# Patient Record
Sex: FEMALE | Race: WHITE | NOT HISPANIC OR LATINO
[De-identification: names, ages, dates, MRNs, and addresses within clinical notes are randomized per-mention and may not be internally consistent; named-entity substitution may affect disease eponyms.]

---

## 2017-11-17 ENCOUNTER — LAB SERVICES (OUTPATIENT)
Dept: OTHER | Age: 32
End: 2017-11-17

## 2017-11-17 ENCOUNTER — CHARTING TRANS (OUTPATIENT)
Dept: OTHER | Age: 32
End: 2017-11-17

## 2017-11-18 LAB
ALBUMIN SERPL-MCNC: 4.3 G/DL (ref 3.6–5.1)
ALBUMIN/GLOB SERPL: 1.3 (ref 1–2.4)
ALP SERPL-CCNC: 88 UNITS/L (ref 45–117)
ALT SERPL-CCNC: 24 UNITS/L
ANION GAP SERPL CALC-SCNC: 12 MMOL/L (ref 10–20)
AST SERPL-CCNC: 17 UNITS/L
BASOPHILS # BLD: 0 K/MCL (ref 0–0.3)
BASOPHILS NFR BLD: 0 %
BILIRUB SERPL-MCNC: 0.4 MG/DL (ref 0.2–1)
BUN SERPL-MCNC: 16 MG/DL (ref 6–20)
BUN/CREAT SERPL: 21 (ref 7–25)
CALCIUM SERPL-MCNC: 9.8 MG/DL (ref 8.4–10.2)
CHLORIDE SERPL-SCNC: 102 MMOL/L (ref 98–107)
CO2 SERPL-SCNC: 29 MMOL/L (ref 21–32)
CREAT SERPL-MCNC: 0.76 MG/DL (ref 0.51–0.95)
DIFFERENTIAL METHOD BLD: ABNORMAL
EOSINOPHIL # BLD: 0.1 K/MCL (ref 0.1–0.5)
EOSINOPHIL NFR BLD: 1 %
ERYTHROCYTE [DISTWIDTH] IN BLOOD: 12.9 % (ref 11–15)
GLOBULIN SER-MCNC: 3.3 G/DL (ref 2–4)
GLUCOSE SERPL-MCNC: 85 MG/DL (ref 65–99)
HEMATOCRIT: 42 % (ref 36–46.5)
HEMOGLOBIN: 13.3 G/DL (ref 12–15.5)
LENGTH OF FAST TIME PATIENT: 0 HRS
LYMPHOCYTES # BLD: 2.2 K/MCL (ref 1–4.8)
LYMPHOCYTES NFR BLD: 28 %
MEAN CORPUSCULAR HEMOGLOBIN: 27.9 PG (ref 26–34)
MEAN CORPUSCULAR HGB CONC: 31.7 G/DL (ref 32–36.5)
MEAN CORPUSCULAR VOLUME: 88.2 FL (ref 78–100)
MONOCYTES # BLD: 0.6 K/MCL (ref 0.3–0.9)
MONOCYTES NFR BLD: 8 %
NEUTROPHILS # BLD: 4.8 K/MCL (ref 1.8–7.7)
NEUTROPHILS NFR BLD: 63 %
PLATELET COUNT: 239 K/MCL (ref 140–450)
POTASSIUM SERPL-SCNC: 4.3 MMOL/L (ref 3.4–5.1)
RED CELL COUNT: 4.76 MIL/MCL (ref 4–5.2)
SODIUM SERPL-SCNC: 139 MMOL/L (ref 135–145)
TOTAL PROTEIN: 7.6 G/DL (ref 6.4–8.2)
TSH SERPL-ACNC: 1.75 MCUNITS/ML (ref 0.35–5)
WHITE BLOOD COUNT: 7.6 K/MCL (ref 4.2–11)

## 2018-11-02 VITALS
RESPIRATION RATE: 16 BRPM | HEIGHT: 67 IN | SYSTOLIC BLOOD PRESSURE: 131 MMHG | TEMPERATURE: 97.4 F | WEIGHT: 199.18 LBS | DIASTOLIC BLOOD PRESSURE: 85 MMHG | BODY MASS INDEX: 31.26 KG/M2 | HEART RATE: 81 BPM

## 2019-08-24 ENCOUNTER — TELEPHONE (OUTPATIENT)
Dept: SCHEDULING | Age: 34
End: 2019-08-24

## 2019-09-27 ENCOUNTER — LAB SERVICES (OUTPATIENT)
Dept: LAB | Age: 34
End: 2019-09-27

## 2019-09-27 ENCOUNTER — OFFICE VISIT (OUTPATIENT)
Dept: FAMILY MEDICINE | Age: 34
End: 2019-09-27

## 2019-09-27 VITALS
SYSTOLIC BLOOD PRESSURE: 124 MMHG | DIASTOLIC BLOOD PRESSURE: 74 MMHG | WEIGHT: 211.97 LBS | TEMPERATURE: 97.7 F | BODY MASS INDEX: 33.27 KG/M2 | HEIGHT: 67 IN | RESPIRATION RATE: 18 BRPM | HEART RATE: 78 BPM | OXYGEN SATURATION: 99 %

## 2019-09-27 DIAGNOSIS — N76.0 ACUTE VAGINITIS: ICD-10-CM

## 2019-09-27 DIAGNOSIS — Z12.4 SCREENING FOR CERVICAL CANCER: ICD-10-CM

## 2019-09-27 DIAGNOSIS — Z00.00 ENCOUNTER FOR GENERAL ADULT MEDICAL EXAMINATION W/O ABNORMAL FINDINGS: Primary | ICD-10-CM

## 2019-09-27 DIAGNOSIS — F41.8 ANXIETY ASSOCIATED WITH DEPRESSION: ICD-10-CM

## 2019-09-27 DIAGNOSIS — Z00.00 ENCOUNTER FOR GENERAL ADULT MEDICAL EXAMINATION W/O ABNORMAL FINDINGS: ICD-10-CM

## 2019-09-27 LAB
BASOPHILS # BLD AUTO: 0 K/MCL (ref 0–0.3)
BASOPHILS NFR BLD AUTO: 1 %
DIFFERENTIAL METHOD BLD: ABNORMAL
EOSINOPHIL # BLD AUTO: 0.1 K/MCL (ref 0.1–0.5)
EOSINOPHIL NFR SPEC: 2 %
ERYTHROCYTE [DISTWIDTH] IN BLOOD: 14 % (ref 11–15)
HCT VFR BLD CALC: 43.3 % (ref 36–46.5)
HGB BLD-MCNC: 13.8 G/DL (ref 12–15.5)
IMM GRANULOCYTES # BLD AUTO: 0 K/MCL (ref 0–0.2)
IMM GRANULOCYTES NFR BLD: 0 %
LYMPHOCYTES # BLD MANUAL: 1.6 K/MCL (ref 1–4.8)
LYMPHOCYTES NFR BLD MANUAL: 27 %
MCH RBC QN AUTO: 27.3 PG (ref 26–34)
MCHC RBC AUTO-ENTMCNC: 31.9 G/DL (ref 32–36.5)
MCV RBC AUTO: 85.6 FL (ref 78–100)
MONOCYTES # BLD MANUAL: 0.5 K/MCL (ref 0.3–0.9)
MONOCYTES NFR BLD MANUAL: 8 %
NEUTROPHILS # BLD: 3.9 K/MCL (ref 1.8–7.7)
NEUTROPHILS NFR BLD AUTO: 62 %
NRBC BLD MANUAL-RTO: 0 /100 WBC
PLATELET # BLD: 244 K/MCL (ref 140–450)
RBC # BLD: 5.06 MIL/MCL (ref 4–5.2)
WBC # BLD: 6.1 K/MCL (ref 4.2–11)

## 2019-09-27 PROCEDURE — 82306 VITAMIN D 25 HYDROXY: CPT | Performed by: FAMILY MEDICINE

## 2019-09-27 PROCEDURE — 99395 PREV VISIT EST AGE 18-39: CPT | Performed by: FAMILY MEDICINE

## 2019-09-27 PROCEDURE — 87510 GARDNER VAG DNA DIR PROBE: CPT | Performed by: FAMILY MEDICINE

## 2019-09-27 PROCEDURE — 87660 TRICHOMONAS VAGIN DIR PROBE: CPT | Performed by: FAMILY MEDICINE

## 2019-09-27 PROCEDURE — 80061 LIPID PANEL: CPT | Performed by: FAMILY MEDICINE

## 2019-09-27 PROCEDURE — 87480 CANDIDA DNA DIR PROBE: CPT | Performed by: FAMILY MEDICINE

## 2019-09-27 PROCEDURE — 80050 GENERAL HEALTH PANEL: CPT | Performed by: FAMILY MEDICINE

## 2019-09-27 PROCEDURE — 36415 COLL VENOUS BLD VENIPUNCTURE: CPT | Performed by: FAMILY MEDICINE

## 2019-09-27 RX ORDER — ESCITALOPRAM OXALATE 10 MG/1
10 TABLET ORAL DAILY
Qty: 30 TABLET | Refills: 2 | Status: SHIPPED | OUTPATIENT
Start: 2019-09-27 | End: 2021-06-26 | Stop reason: CLARIF

## 2019-09-27 ASSESSMENT — ANXIETY QUESTIONNAIRES
4. TROUBLE RELAXING: 3
3. WORRYING TOO MUCH ABOUT DIFFERENT THINGS: 3
2. NOT BEING ABLE TO STOP OR CONTROL WORRYING: NEARLY EVERY DAY
4. TROUBLE RELAXING: NEARLY EVERY DAY
5. BEING SO RESTLESS THAT IT IS HARD TO SIT STILL: 1
7. FEELING AFRAID AS IF SOMETHING AWFUL MIGHT HAPPEN: NEARLY EVERY DAY
IF YOU CHECKED OFF ANY PROBLEMS ON THIS QUESTIONNAIRE, HOW DIFFICULT HAVE THESE PROBLEMS MADE IT FOR YOU TO DO YOUR WORK, TAKE CARE OF THINGS AT HOME, OR GET ALONG WITH OTHER PEOPLE: EXTREMELY DIFFICULT
6. BECOMING EASILY ANNOYED OR IRRITABLE: 3
1. FEELING NERVOUS, ANXIOUS, OR ON EDGE: NEARLY EVERY DAY
5. BEING SO RESTLESS THAT IT IS HARD TO SIT STILL: SEVERAL DAYS
1. FEELING NERVOUS, ANXIOUS, OR ON EDGE: 3
6. BECOMING EASILY ANNOYED OR IRRITABLE: NEARLY EVERY DAY
7. FEELING AFRAID AS IF SOMETHING AWFUL MIGHT HAPPEN: 3
3. WORRYING TOO MUCH ABOUT DIFFERENT THINGS: NEARLY EVERY DAY
2. NOT BEING ABLE TO STOP OR CONTROL WORRYING: 3
GAD7 TOTAL SCORE: 19

## 2019-09-27 ASSESSMENT — PATIENT HEALTH QUESTIONNAIRE - PHQ9
6. FEELING BAD ABOUT YOURSELF - OR THAT YOU ARE A FAILURE OR HAVE LET YOURSELF OR YOUR FAMILY DOWN: SEVERAL DAYS
SUM OF ALL RESPONSES TO PHQ9 QUESTIONS 1 AND 2: 2
SUM OF ALL RESPONSES TO PHQ QUESTIONS 1-9: 11
9. THOUGHTS THAT YOU WOULD BE BETTER OFF DEAD, OR OF HURTING YOURSELF: NOT AT ALL
2. FEELING DOWN, DEPRESSED OR HOPELESS: SEVERAL DAYS
SUM OF ALL RESPONSES TO PHQ9 QUESTIONS 1 TO 9: 11
7. TROUBLE CONCENTRATING ON THINGS, SUCH AS READING THE NEWSPAPER OR WATCHING TELEVISION: SEVERAL DAYS
8. MOVING OR SPEAKING SO SLOWLY THAT OTHER PEOPLE COULD HAVE NOTICED. OR THE OPPOSITE, BEING SO FIGETY OR RESTLESS THAT YOU HAVE BEEN MOVING AROUND A LOT MORE THAN USUAL: SEVERAL DAYS
3. TROUBLE FALLING OR STAYING ASLEEP OR SLEEPING TOO MUCH: SEVERAL DAYS
10. IF YOU CHECKED OFF ANY PROBLEMS, HOW DIFFICULT HAVE THESE PROBLEMS MADE IT FOR YOU TO DO YOUR WORK, TAKE CARE OF THINGS AT HOME, OR GET ALONG WITH OTHER PEOPLE: EXTREMELY DIFFICULT
SUM OF ALL RESPONSES TO PHQ9 QUESTIONS 1 AND 2: 2
5. POOR APPETITE, WEIGHT LOSS, OR OVEREATING: NEARLY EVERY DAY
CLINICAL INTERPRETATION OF PHQ9 SCORE: MODERATE DEPRESSION
1. LITTLE INTEREST OR PLEASURE IN DOING THINGS: SEVERAL DAYS
2. FEELING DOWN, DEPRESSED OR HOPELESS: NOT AT ALL
SUM OF ALL RESPONSES TO PHQ9 QUESTIONS 1 AND 2: 0
4. FEELING TIRED OR HAVING LITTLE ENERGY: MORE THAN HALF THE DAYS
1. LITTLE INTEREST OR PLEASURE IN DOING THINGS: NOT AT ALL

## 2019-09-28 LAB
25(OH)D3+25(OH)D2 SERPL-MCNC: 30.1 NG/ML (ref 30–100)
ALBUMIN SERPL-MCNC: 4.2 G/DL (ref 3.6–5.1)
ALBUMIN/GLOB SERPL: 1.3 {RATIO} (ref 1–2.4)
ALP SERPL-CCNC: 91 UNITS/L (ref 45–117)
ALT SERPL-CCNC: 24 UNITS/L
ANION GAP SERPL CALC-SCNC: 14 MMOL/L (ref 10–20)
AST SERPL-CCNC: 16 UNITS/L
BILIRUB SERPL-MCNC: 0.9 MG/DL (ref 0.2–1)
BUN SERPL-MCNC: 13 MG/DL (ref 6–20)
BUN/CREAT SERPL: 18 (ref 7–25)
CALCIUM SERPL-MCNC: 9.3 MG/DL (ref 8.4–10.2)
CANDIDA RRNA VAG QL PROBE: NEGATIVE
CHLORIDE SERPL-SCNC: 106 MMOL/L (ref 98–107)
CHOLEST SERPL-MCNC: 154 MG/DL
CHOLEST/HDLC SERPL: 3.1 {RATIO}
CO2 SERPL-SCNC: 23 MMOL/L (ref 21–32)
CREAT SERPL-MCNC: 0.72 MG/DL (ref 0.51–0.95)
FASTING STATUS PATIENT QL REPORTED: 10 HRS
G VAGINALIS RRNA GENITAL QL PROBE: POSITIVE
GLOBULIN SER-MCNC: 3.3 G/DL (ref 2–4)
GLUCOSE SERPL-MCNC: 85 MG/DL (ref 65–99)
HDLC SERPL-MCNC: 50 MG/DL
LDLC SERPL-MCNC: 94 MG/DL
LENGTH OF FAST TIME PATIENT: 10 HRS
NONHDLC SERPL-MCNC: 104 MG/DL
POTASSIUM SERPL-SCNC: 4.4 MMOL/L (ref 3.4–5.1)
PROT SERPL-MCNC: 7.5 G/DL (ref 6.4–8.2)
SODIUM SERPL-SCNC: 139 MMOL/L (ref 135–145)
T VAGINALIS RRNA GENITAL QL PROBE: NEGATIVE
TRIGL SERPL-MCNC: 48 MG/DL
TSH SERPL-ACNC: 1.89 MCUNITS/ML (ref 0.35–5)

## 2019-09-30 ENCOUNTER — E-ADVICE (OUTPATIENT)
Dept: FAMILY MEDICINE | Age: 34
End: 2019-09-30

## 2019-09-30 DIAGNOSIS — N76.0 ACUTE VAGINITIS: Primary | ICD-10-CM

## 2019-09-30 RX ORDER — CLINDAMYCIN PHOSPHATE 20 MG/G
1 CREAM VAGINAL NIGHTLY
Qty: 40 G | Refills: 1 | Status: SHIPPED | OUTPATIENT
Start: 2019-09-30 | End: 2021-06-26 | Stop reason: CLARIF

## 2019-10-03 LAB — HPV16+18+45 E6+E7MRNA CVX NAA+PROBE: NORMAL

## 2020-06-27 ENCOUNTER — E-ADVICE (OUTPATIENT)
Dept: FAMILY MEDICINE | Age: 35
End: 2020-06-27

## 2021-06-26 ENCOUNTER — OFFICE VISIT (OUTPATIENT)
Dept: FAMILY MEDICINE | Age: 36
End: 2021-06-26

## 2021-06-26 VITALS
RESPIRATION RATE: 16 BRPM | WEIGHT: 222.66 LBS | SYSTOLIC BLOOD PRESSURE: 133 MMHG | DIASTOLIC BLOOD PRESSURE: 70 MMHG | HEIGHT: 67 IN | OXYGEN SATURATION: 99 % | TEMPERATURE: 96.4 F | HEART RATE: 85 BPM | BODY MASS INDEX: 34.95 KG/M2

## 2021-06-26 DIAGNOSIS — Z12.4 CERVICAL CANCER SCREENING: ICD-10-CM

## 2021-06-26 DIAGNOSIS — N76.1 CHRONIC VAGINITIS: Primary | ICD-10-CM

## 2021-06-26 PROCEDURE — 88175 CYTOPATH C/V AUTO FLUID REDO: CPT | Performed by: CLINICAL MEDICAL LABORATORY

## 2021-06-26 PROCEDURE — 87510 GARDNER VAG DNA DIR PROBE: CPT | Performed by: FAMILY MEDICINE

## 2021-06-26 PROCEDURE — 87624 HPV HI-RISK TYP POOLED RSLT: CPT | Performed by: CLINICAL MEDICAL LABORATORY

## 2021-06-26 PROCEDURE — 87660 TRICHOMONAS VAGIN DIR PROBE: CPT | Performed by: FAMILY MEDICINE

## 2021-06-26 PROCEDURE — 99395 PREV VISIT EST AGE 18-39: CPT | Performed by: FAMILY MEDICINE

## 2021-06-26 PROCEDURE — 87480 CANDIDA DNA DIR PROBE: CPT | Performed by: FAMILY MEDICINE

## 2021-06-26 ASSESSMENT — PATIENT HEALTH QUESTIONNAIRE - PHQ9
2. FEELING DOWN, DEPRESSED OR HOPELESS: NOT AT ALL
1. LITTLE INTEREST OR PLEASURE IN DOING THINGS: NOT AT ALL
CLINICAL INTERPRETATION OF PHQ2 SCORE: NO FURTHER SCREENING NEEDED
SUM OF ALL RESPONSES TO PHQ9 QUESTIONS 1 AND 2: 0
SUM OF ALL RESPONSES TO PHQ9 QUESTIONS 1 AND 2: 0
CLINICAL INTERPRETATION OF PHQ9 SCORE: NO FURTHER SCREENING NEEDED

## 2021-06-26 ASSESSMENT — ENCOUNTER SYMPTOMS
PSYCHIATRIC NEGATIVE: 1
CONSTITUTIONAL NEGATIVE: 1

## 2021-06-26 ASSESSMENT — PAIN SCALES - GENERAL: PAINLEVEL: 0

## 2021-06-28 LAB
CANDIDA RRNA VAG QL PROBE: NEGATIVE
G VAGINALIS RRNA GENITAL QL PROBE: NEGATIVE
T VAGINALIS RRNA GENITAL QL PROBE: NEGATIVE

## 2021-07-06 LAB
CASE RPRT: NORMAL
CYTOLOGY CVX/VAG STUDY: NORMAL
HPV16+18+45 E6+E7MRNA CVX NAA+PROBE: NEGATIVE
Lab: NORMAL
PAP EDUCATIONAL NOTE: NORMAL
SPECIMEN ADEQUACY: NORMAL

## 2021-08-09 RX ORDER — CLOTRIMAZOLE AND BETAMETHASONE DIPROPIONATE 10; .64 MG/G; MG/G
1 CREAM TOPICAL 2 TIMES DAILY
Qty: 45 G | Refills: 2 | Status: SHIPPED | OUTPATIENT
Start: 2021-08-09

## 2021-12-10 ENCOUNTER — WALK IN (OUTPATIENT)
Dept: URGENT CARE | Age: 36
End: 2021-12-10
Attending: EMERGENCY MEDICINE

## 2021-12-10 VITALS
WEIGHT: 200 LBS | HEART RATE: 100 BPM | TEMPERATURE: 98.6 F | BODY MASS INDEX: 31.32 KG/M2 | DIASTOLIC BLOOD PRESSURE: 85 MMHG | SYSTOLIC BLOOD PRESSURE: 125 MMHG | OXYGEN SATURATION: 97 % | RESPIRATION RATE: 16 BRPM

## 2021-12-10 DIAGNOSIS — J01.90 ACUTE SINUSITIS, RECURRENCE NOT SPECIFIED, UNSPECIFIED LOCATION: Primary | ICD-10-CM

## 2021-12-10 PROCEDURE — 99202 OFFICE O/P NEW SF 15 MIN: CPT

## 2021-12-10 RX ORDER — METHYLPREDNISOLONE 4 MG/1
4 TABLET ORAL SEE ADMIN INSTRUCTIONS
Qty: 21 TABLET | Refills: 0 | Status: SHIPPED | OUTPATIENT
Start: 2021-12-10

## 2021-12-10 RX ORDER — AMOXICILLIN AND CLAVULANATE POTASSIUM 875; 125 MG/1; MG/1
1 TABLET, FILM COATED ORAL EVERY 12 HOURS
Qty: 20 TABLET | Refills: 0 | Status: SHIPPED | OUTPATIENT
Start: 2021-12-10

## 2021-12-10 ASSESSMENT — ENCOUNTER SYMPTOMS
GASTROINTESTINAL NEGATIVE: 1
RHINORRHEA: 1
SINUS PAIN: 1
NEUROLOGICAL NEGATIVE: 1
RESPIRATORY NEGATIVE: 1
CHILLS: 1
SINUS PRESSURE: 1
FEVER: 1

## 2023-02-14 ENCOUNTER — OFFICE VISIT (OUTPATIENT)
Dept: INTEGRATIVE MEDICINE | Facility: CLINIC | Age: 38
End: 2023-02-14
Payer: COMMERCIAL

## 2023-02-14 VITALS
HEIGHT: 67 IN | SYSTOLIC BLOOD PRESSURE: 120 MMHG | BODY MASS INDEX: 34.81 KG/M2 | OXYGEN SATURATION: 99 % | WEIGHT: 221.81 LBS | HEART RATE: 93 BPM | DIASTOLIC BLOOD PRESSURE: 80 MMHG

## 2023-02-14 DIAGNOSIS — F41.9 ANXIETY: ICD-10-CM

## 2023-02-14 DIAGNOSIS — N92.1 MENORRHAGIA WITH IRREGULAR CYCLE: ICD-10-CM

## 2023-02-14 DIAGNOSIS — R63.5 WEIGHT GAIN: Primary | ICD-10-CM

## 2023-02-14 DIAGNOSIS — R53.83 OTHER FATIGUE: ICD-10-CM

## 2023-02-14 PROCEDURE — 99205 OFFICE O/P NEW HI 60 MIN: CPT | Performed by: FAMILY MEDICINE

## 2023-02-14 PROCEDURE — 3008F BODY MASS INDEX DOCD: CPT | Performed by: FAMILY MEDICINE

## 2023-02-14 PROCEDURE — 3079F DIAST BP 80-89 MM HG: CPT | Performed by: FAMILY MEDICINE

## 2023-02-14 PROCEDURE — 3074F SYST BP LT 130 MM HG: CPT | Performed by: FAMILY MEDICINE

## 2023-02-14 RX ORDER — FLASH GLUCOSE SENSOR
1 KIT MISCELLANEOUS
Qty: 2 EACH | Refills: 0 | Status: SHIPPED | OUTPATIENT
Start: 2023-02-14

## 2023-02-14 RX ORDER — FLASH GLUCOSE SCANNING READER
1 EACH MISCELLANEOUS ONCE
Qty: 1 EACH | Refills: 0 | Status: SHIPPED | OUTPATIENT
Start: 2023-02-14 | End: 2023-02-14

## 2023-10-25 ENCOUNTER — OFFICE VISIT (OUTPATIENT)
Dept: INTEGRATIVE MEDICINE | Facility: CLINIC | Age: 38
End: 2023-10-25

## 2023-10-25 VITALS
SYSTOLIC BLOOD PRESSURE: 110 MMHG | HEART RATE: 101 BPM | OXYGEN SATURATION: 99 % | DIASTOLIC BLOOD PRESSURE: 76 MMHG | WEIGHT: 224 LBS | BODY MASS INDEX: 35.16 KG/M2 | HEIGHT: 67 IN

## 2023-10-25 DIAGNOSIS — Z00.00 ENCOUNTER FOR WELLNESS EXAMINATION IN ADULT: Primary | ICD-10-CM

## 2023-10-25 DIAGNOSIS — Z11.51 ENCOUNTER FOR SCREENING FOR HUMAN PAPILLOMAVIRUS (HPV): ICD-10-CM

## 2023-10-25 DIAGNOSIS — L98.9 SKIN LESION: ICD-10-CM

## 2023-10-25 DIAGNOSIS — Z12.4 PAP SMEAR FOR CERVICAL CANCER SCREENING: ICD-10-CM

## 2023-10-25 PROCEDURE — 3078F DIAST BP <80 MM HG: CPT | Performed by: PHYSICIAN ASSISTANT

## 2023-10-25 PROCEDURE — 3008F BODY MASS INDEX DOCD: CPT | Performed by: PHYSICIAN ASSISTANT

## 2023-10-25 PROCEDURE — 87624 HPV HI-RISK TYP POOLED RSLT: CPT | Performed by: PHYSICIAN ASSISTANT

## 2023-10-25 PROCEDURE — 99395 PREV VISIT EST AGE 18-39: CPT | Performed by: PHYSICIAN ASSISTANT

## 2023-10-25 PROCEDURE — 3074F SYST BP LT 130 MM HG: CPT | Performed by: PHYSICIAN ASSISTANT

## 2023-10-25 NOTE — PROGRESS NOTES
Nabor Ghosh is a 43year old female. Patient presents with:  Establish Care: Physical with pap      HPI:   43yo female     Overall feeling pretty good. Struggles with weight loss. Joined gym and going to workout classes: spin and strength training. Does a lot of sitting at computer for work. EtOh - denies  Tobacco/Illicits - denies    Gyn - Regular menses, 30-31d cycles  Mammogram - none yet  Pap  - last one was 2yrs ago, perhaps an abnormal one many years ago    Has to get ultrasound yet, was ordered by Dr. Luis Carlos Brizuela due to significant fam h/o ovarian and breast cancer. Waiting until new insurance in the new year kicks in. See Below for pertinent lifestyle factors. REVIEW OF SYSTEMS:   Review of Systems   Constitutional:  Negative for appetite change, chills, diaphoresis, fatigue and fever. HENT:  Negative for congestion, hearing loss, postnasal drip, sinus pressure, sinus pain, sneezing and voice change. Eyes:  Negative for photophobia, redness and visual disturbance. Respiratory:  Negative for apnea, cough, chest tightness, shortness of breath and wheezing. Cardiovascular:  Negative for chest pain, palpitations and leg swelling. Gastrointestinal:  Negative for abdominal distention, abdominal pain, blood in stool, constipation, diarrhea, nausea and vomiting. Endocrine: Negative for cold intolerance, heat intolerance, polydipsia, polyphagia and polyuria. Genitourinary:  Negative for dysuria, frequency, hematuria and urgency. Musculoskeletal:  Negative for arthralgias, back pain, gait problem, joint swelling and myalgias. Skin:  Negative for color change, pallor and rash. Lesion on right breast   Allergic/Immunologic: Negative for environmental allergies and food allergies. Neurological:  Negative for dizziness, tremors, syncope, weakness, light-headedness, numbness and headaches. Hematological:  Negative for adenopathy. Does not bruise/bleed easily. Psychiatric/Behavioral:  Negative for agitation, confusion, decreased concentration and sleep disturbance. The patient is not nervous/anxious. FAMILY HISTORY:      Family History   Problem Relation Age of Onset    Cancer Father     Hypertension Father     Hypertension Mother     Cancer Maternal Grandmother     Cancer Maternal Grandfather     Cancer Paternal Grandmother     Heart Disorder Paternal Grandfather     Hypertension Brother      Breast and ovarian ca - 2 aunts  at 54yo  MGM colon cancer    Father, stage 4 prostate cancer  Mother, healthy  MEDICAL HISTORY:   History reviewed. No pertinent past medical history. CURRENT MEDICATIONS:     No current outpatient medications on file. SOCIAL HISTORY:   Lifestyle Factors affecting health:  Diet - cooks at home, whole foods, limits processed foods. Before menses gets increased cravings. Exercise - see above  Stress - high   Sleep - good, falls asleep easily by 10pm    , in monogamous relationship  Son in , daughter 9yo  Social History     Socioeconomic History    Marital status:    Tobacco Use    Smoking status: Never    Smokeless tobacco: Never   Vaping Use    Vaping Use: Never used   Substance and Sexual Activity    Alcohol use: Never    Drug use: Never   Other Topics Concern    Caffeine Concern No    Exercise Yes    Seat Belt No    Special Diet No    Stress Concern Yes    Weight Concern Yes       SURGICAL HISTORY:     Past Surgical History:   Procedure Laterality Date    Tibia fracture surgery Left        PHYSICAL EXAM:      10/25/23  1135 10/25/23  1237   BP: 148/80 110/76   BP Location: Right arm    Patient Position: Sitting    Cuff Size: adult    Pulse: 101    SpO2: 99%    Weight: 224 lb (101.6 kg)    Height: 5' 7\" (1.702 m)        Physical Exam  Vitals reviewed. Constitutional:       General: She is not in acute distress. HENT:      Head: Normocephalic and atraumatic.       Right Ear: Tympanic membrane and ear canal normal. There is no impacted cerumen. Left Ear: Tympanic membrane and ear canal normal. There is no impacted cerumen. Nose: Nose normal.      Mouth/Throat:      Mouth: Mucous membranes are moist.      Pharynx: Oropharynx is clear. No posterior oropharyngeal erythema. Eyes:      General: No scleral icterus. Extraocular Movements: Extraocular movements intact. Conjunctiva/sclera: Conjunctivae normal.      Pupils: Pupils are equal, round, and reactive to light. Neck:      Thyroid: No thyromegaly. Vascular: No carotid bruit. Cardiovascular:      Rate and Rhythm: Normal rate and regular rhythm. Pulses: Normal pulses. Heart sounds: Normal heart sounds. No murmur heard. No friction rub. No gallop. Pulmonary:      Effort: Pulmonary effort is normal.      Breath sounds: Normal breath sounds. No wheezing, rhonchi or rales. Abdominal:      General: Bowel sounds are normal.      Palpations: Abdomen is soft. There is no mass. Tenderness: There is no abdominal tenderness. There is no guarding. Hernia: No hernia is present. Genitourinary:     General: Normal vulva. Labia:         Right: No rash, tenderness or lesion. Left: No rash, tenderness or lesion. Comments: Increased white discharge in vaginal canal  Musculoskeletal:         General: No swelling or tenderness. Normal range of motion. Cervical back: Normal range of motion. Right lower leg: No edema. Left lower leg: No edema. Lymphadenopathy:      Cervical: No cervical adenopathy. Skin:     General: Skin is warm and dry. Capillary Refill: Capillary refill takes less than 2 seconds. Findings: No bruising, erythema, lesion or rash. Comments: Light brown well demarcated slightly raised lesion on Right upper breast.     Neurological:      General: No focal deficit present. Mental Status: She is alert and oriented to person, place, and time. Sensory: No sensory deficit. Motor: No weakness. Gait: Gait normal.      Deep Tendon Reflexes: Reflexes normal.   Psychiatric:         Mood and Affect: Mood normal.         Behavior: Behavior normal.         Thought Content: Thought content normal.         Judgment: Judgment normal.          ASSESSMENT AND PLAN:     No visits with results within 6 Month(s) from this visit. Latest known visit with results is:   No results found for any previous visit. No results found. 1. Encounter for wellness examination in adult  - Vitamin D; Future    2. BMI 35.0-35.9,adult    3. Pap smear for cervical cancer screening  - ThinPrep PAP Smear; Future  - ThinPrep PAP Smear    4. Encounter for screening for human papillomavirus (HPV)  - Hpv Dna  High Risk , Thin Prep Collect; Future  - Hpv Dna  High Risk , Thin Prep Collect    5. Skin lesion      Time spent with patient: Over 40 minutes spent in chart review and in direct communication with patient obtaining and reviewing history, creating a unique care plan, explaining the rationale for treatment, reviewing potential SE and overall treatment plan,  documenting all clinical information in Epic. Over 50% of this time was in education, counseling and coordination of care. Problem List Items Addressed This Visit    None  Visit Diagnoses       Encounter for wellness examination in adult    -  Primary    Relevant Orders    Vitamin D    BMI 35.0-35.9,adult        Pap smear for cervical cancer screening        Relevant Orders    ThinPrep PAP Smear    Encounter for screening for human papillomavirus (HPV)        Relevant Orders    Hpv Dna  High Risk , Thin Prep Collect    Skin lesion               Physical Exam Normal:  -Seborrheic keratosis on right upper breast. Pt to monitor and if changes in size or color, see Derm for eval/removal.    Advised on routine preventive health guidelines and handout provided.     Discussed with patient the following recommendations:  Monthly breast self-exam  Breast cancer screening/ mammograms and clinical breast exams  Cervical cancer screening/ pap smears  Healthy diet focusing on adequate intake of protein, vegetables and fruits, appropriate portion sizes, minimizing simple carbohydrate foods/sugars. Striving for at least 30min of exercise 3-4days/wk. Aim for 1/2 your body weight, in ounces of water     Labs: See above orders for details, to get labs that were ordered by Dr. Rayna Yu 2/14. Mammogram: Next year  Pap: Done today    Patient affirmed understanding of plan and all questions were answered. Pt would like to further discuss weight gain. F/u in 8weeks once labs are complete to review and discuss next steps. Given further recommendations as below    Orders Placed This Visit:  Orders Placed This Encounter      Hpv Dna  High Risk , Thin Prep Collect      Vitamin D      ThinPrep PAP Smear    No orders of the defined types were placed in this encounter. Patient Instructions   Get labs drawn fasting on day 21-22 of your menstrual cycle. No follow-ups on file. Patient affirmed understanding of plan and all questions were answered.      Stacey Eldridge PA-C

## 2023-11-03 LAB — HPV I/H RISK 1 DNA SPEC QL NAA+PROBE: NEGATIVE

## 2023-11-18 ENCOUNTER — LAB ENCOUNTER (OUTPATIENT)
Dept: LAB | Age: 38
End: 2023-11-18
Attending: PHYSICIAN ASSISTANT
Payer: COMMERCIAL

## 2023-11-18 DIAGNOSIS — R53.83 OTHER FATIGUE: ICD-10-CM

## 2023-11-18 DIAGNOSIS — R63.5 WEIGHT GAIN: ICD-10-CM

## 2023-11-18 DIAGNOSIS — F41.9 ANXIETY: ICD-10-CM

## 2023-11-18 DIAGNOSIS — N92.1 MENORRHAGIA WITH IRREGULAR CYCLE: ICD-10-CM

## 2023-11-18 LAB
ALBUMIN SERPL-MCNC: 4.7 G/DL (ref 3.2–4.8)
ALBUMIN/GLOB SERPL: 1.6 {RATIO} (ref 1–2)
ALP LIVER SERPL-CCNC: 82 U/L
ALT SERPL-CCNC: 15 U/L
ANION GAP SERPL CALC-SCNC: 7 MMOL/L (ref 0–18)
AST SERPL-CCNC: 18 U/L (ref ?–34)
BASOPHILS # BLD AUTO: 0.03 X10(3) UL (ref 0–0.2)
BASOPHILS NFR BLD AUTO: 0.5 %
BILIRUB SERPL-MCNC: 0.8 MG/DL (ref 0.3–1.2)
BUN BLD-MCNC: 14 MG/DL (ref 9–23)
BUN/CREAT SERPL: 18.2 (ref 10–20)
CALCIUM BLD-MCNC: 9.7 MG/DL (ref 8.7–10.4)
CHLORIDE SERPL-SCNC: 106 MMOL/L (ref 98–112)
CHOLEST SERPL-MCNC: 172 MG/DL (ref ?–200)
CO2 SERPL-SCNC: 25 MMOL/L (ref 21–32)
CREAT BLD-MCNC: 0.77 MG/DL
DEPRECATED HBV CORE AB SER IA-ACNC: 2.1 NG/ML
DEPRECATED RDW RBC AUTO: 43.8 FL (ref 35.1–46.3)
EGFRCR SERPLBLD CKD-EPI 2021: 101 ML/MIN/1.73M2 (ref 60–?)
EOSINOPHIL # BLD AUTO: 0.16 X10(3) UL (ref 0–0.7)
EOSINOPHIL NFR BLD AUTO: 2.5 %
ERYTHROCYTE [DISTWIDTH] IN BLOOD BY AUTOMATED COUNT: 15.5 % (ref 11–15)
EST. AVERAGE GLUCOSE BLD GHB EST-MCNC: 117 MG/DL (ref 68–126)
FASTING PATIENT LIPID ANSWER: YES
FASTING STATUS PATIENT QL REPORTED: YES
FOLATE SERPL-MCNC: >24 NG/ML (ref 5.4–?)
FSH SERPL-ACNC: 5 MIU/ML
GLOBULIN PLAS-MCNC: 2.9 G/DL (ref 2.8–4.4)
GLUCOSE BLD-MCNC: 91 MG/DL (ref 70–99)
HBA1C MFR BLD: 5.7 % (ref ?–5.7)
HCT VFR BLD AUTO: 40.2 %
HDLC SERPL-MCNC: 54 MG/DL (ref 40–59)
HGB BLD-MCNC: 12.8 G/DL
IMM GRANULOCYTES # BLD AUTO: 0.02 X10(3) UL (ref 0–1)
IMM GRANULOCYTES NFR BLD: 0.3 %
INSULIN SERPL-ACNC: 9.8 MU/L (ref 3–25)
LDLC SERPL CALC-MCNC: 106 MG/DL (ref ?–100)
LH SERPL-ACNC: 12.7 MIU/ML
LYMPHOCYTES # BLD AUTO: 1.57 X10(3) UL (ref 1–4)
LYMPHOCYTES NFR BLD AUTO: 24.6 %
MCH RBC QN AUTO: 24.9 PG (ref 26–34)
MCHC RBC AUTO-ENTMCNC: 31.8 G/DL (ref 31–37)
MCV RBC AUTO: 78.1 FL
MONOCYTES # BLD AUTO: 0.43 X10(3) UL (ref 0.1–1)
MONOCYTES NFR BLD AUTO: 6.7 %
NEUTROPHILS # BLD AUTO: 4.17 X10 (3) UL (ref 1.5–7.7)
NEUTROPHILS # BLD AUTO: 4.17 X10(3) UL (ref 1.5–7.7)
NEUTROPHILS NFR BLD AUTO: 65.4 %
NONHDLC SERPL-MCNC: 118 MG/DL (ref ?–130)
OSMOLALITY SERPL CALC.SUM OF ELEC: 286 MOSM/KG (ref 275–295)
PLATELET # BLD AUTO: 227 10(3)UL (ref 150–450)
POTASSIUM SERPL-SCNC: 4 MMOL/L (ref 3.5–5.1)
PROGEST SERPL-MCNC: 13.16 NG/ML
PROT SERPL-MCNC: 7.6 G/DL (ref 5.7–8.2)
RBC # BLD AUTO: 5.15 X10(6)UL
SODIUM SERPL-SCNC: 138 MMOL/L (ref 136–145)
T3FREE SERPL-MCNC: 3.55 PG/ML (ref 2.4–4.2)
T4 FREE SERPL-MCNC: 1.3 NG/DL (ref 0.8–1.7)
TRIGL SERPL-MCNC: 60 MG/DL (ref 30–149)
TSI SER-ACNC: 3.48 MIU/ML (ref 0.55–4.78)
VIT B12 SERPL-MCNC: 445 PG/ML (ref 211–911)
VLDLC SERPL CALC-MCNC: 10 MG/DL (ref 0–30)
WBC # BLD AUTO: 6.4 X10(3) UL (ref 4–11)

## 2023-11-18 PROCEDURE — 83735 ASSAY OF MAGNESIUM: CPT

## 2023-11-18 PROCEDURE — 80061 LIPID PANEL: CPT

## 2023-11-18 PROCEDURE — 82746 ASSAY OF FOLIC ACID SERUM: CPT

## 2023-11-18 PROCEDURE — 84439 ASSAY OF FREE THYROXINE: CPT

## 2023-11-18 PROCEDURE — 82728 ASSAY OF FERRITIN: CPT

## 2023-11-18 PROCEDURE — 85025 COMPLETE CBC W/AUTO DIFF WBC: CPT

## 2023-11-18 PROCEDURE — 84443 ASSAY THYROID STIM HORMONE: CPT

## 2023-11-18 PROCEDURE — 83001 ASSAY OF GONADOTROPIN (FSH): CPT

## 2023-11-18 PROCEDURE — 82607 VITAMIN B-12: CPT

## 2023-11-18 PROCEDURE — 83036 HEMOGLOBIN GLYCOSYLATED A1C: CPT

## 2023-11-18 PROCEDURE — 84410 TESTOSTERONE BIOAVAILABLE: CPT

## 2023-11-18 PROCEDURE — 84482 T3 REVERSE: CPT

## 2023-11-18 PROCEDURE — 83525 ASSAY OF INSULIN: CPT

## 2023-11-18 PROCEDURE — 82671 ASSAY OF ESTROGENS: CPT

## 2023-11-18 PROCEDURE — 80053 COMPREHEN METABOLIC PANEL: CPT

## 2023-11-18 PROCEDURE — 83002 ASSAY OF GONADOTROPIN (LH): CPT

## 2023-11-18 PROCEDURE — 84144 ASSAY OF PROGESTERONE: CPT

## 2023-11-18 PROCEDURE — 84481 FREE ASSAY (FT-3): CPT

## 2023-11-18 PROCEDURE — 83520 IMMUNOASSAY QUANT NOS NONAB: CPT

## 2023-11-18 PROCEDURE — 36415 COLL VENOUS BLD VENIPUNCTURE: CPT

## 2023-11-22 LAB
ESTRADIOL: 99.7 PG/ML
ESTRONE: 57 PG/ML
LEPTIN: 63.9 NG/ML

## 2023-11-25 LAB — REVERSE T3: 20.3 NG/DL

## 2023-11-29 LAB
MAGNESIUM RBC: 5.2 MG/DL
SEX HORM BIND GLOB: 35.3 NMOL/L
TESTOST % FREE+WEAK BND: 15.9 %
TESTOST FREE+WEAK BND: 5 NG/DL
TESTOSTERONE TOT /MS: 31.3 NG/DL

## 2023-12-27 ENCOUNTER — TELEMEDICINE (OUTPATIENT)
Dept: INTEGRATIVE MEDICINE | Facility: CLINIC | Age: 38
End: 2023-12-27
Payer: COMMERCIAL

## 2023-12-27 VITALS — BODY MASS INDEX: 34.53 KG/M2 | HEIGHT: 67 IN | WEIGHT: 220 LBS

## 2023-12-27 DIAGNOSIS — R14.3 FLATULENCE: ICD-10-CM

## 2023-12-27 DIAGNOSIS — R14.0 ABDOMINAL BLOATING: ICD-10-CM

## 2023-12-27 DIAGNOSIS — R79.89 LOW VITAMIN D LEVEL: ICD-10-CM

## 2023-12-27 DIAGNOSIS — R73.03 PREDIABETES: ICD-10-CM

## 2023-12-27 DIAGNOSIS — R79.0 LOW FERRITIN LEVEL: Primary | ICD-10-CM

## 2023-12-27 DIAGNOSIS — R53.83 OTHER FATIGUE: ICD-10-CM

## 2023-12-27 PROCEDURE — 99214 OFFICE O/P EST MOD 30 MIN: CPT | Performed by: PHYSICIAN ASSISTANT

## 2023-12-27 RX ORDER — MELATONIN
325
COMMUNITY

## 2023-12-27 NOTE — PROGRESS NOTES
Bertrand Mora is a 43year old female. Chief Complaint   Patient presents with    Follow - Up     Patient presents for blood work results. HPI:   43yo female presents today for f/u on labs. Also reports chronic gas and bloating. Struggles with weight loss. Joined gym and going to workout classes: spin and strength training. Does a lot of sitting at computer for work. Low ferritin - she feels it could be secondary to digestive issues. Did start on iron supplement Hemagenics x2wks ago. Menses heavy first couple days. Has appt to get ultrasound in Jan, was ordered by Dr. Vince Agosto due to significant fam h/o ovarian and breast cancer. Fatigue in afternoon and mood dips. GI - Has always had GI issues since childhood. Does have a lot of bloating and gas. Does question gluten as a trigger. (Daughter has a gluten sensitivity, ADHD, and tested positive for H. pylori.)  Denies abdominal pain, melena or hematochezia. No colonoscopies to date. Family history of gastric cancer, no colon cancer. See Below for pertinent lifestyle factors. REVIEW OF SYSTEMS:   Review of Systems   Constitutional:  Positive for fatigue. Negative for chills and fever. Eyes: Negative. Negative for visual disturbance. Respiratory: Negative. Negative for cough, shortness of breath and wheezing. Cardiovascular: Negative. Negative for chest pain. Gastrointestinal:  Negative for abdominal pain, blood in stool, nausea and vomiting. Bloating and gas   Endocrine: Negative. Genitourinary: Negative. Musculoskeletal: Negative. Skin: Negative. Allergic/Immunologic: Negative. Neurological: Negative. Negative for weakness and headaches. Hematological: Negative. Psychiatric/Behavioral: Negative.               FAMILY HISTORY:      Family History   Problem Relation Age of Onset    Cancer Father     Hypertension Father     Hypertension Mother     Cancer Maternal Grandmother Cancer Maternal Grandfather     Cancer Paternal Grandmother     Heart Disorder Paternal Grandfather     Hypertension Brother      Breast and ovarian ca - 2 aunts  at 52yo  MGM stomach cancer    Father, stage 4 prostate cancer  Mother, healthy  MEDICAL HISTORY:   History reviewed. No pertinent past medical history. CURRENT MEDICATIONS:     Current Outpatient Medications   Medication Sig Dispense Refill    ferrous sulfate 325 (65 FE) MG Oral Tab EC Take 1 tablet (325 mg total) by mouth daily with breakfast.         SOCIAL HISTORY:   Lifestyle Factors affecting health:  Diet - cooks at home, whole foods, limits processed foods. Before menses gets increased cravings. Exercise - see above  Stress - high   Sleep - good, falls asleep easily by 10pm    , in monogamous relationship  Son in , daughter 9yo    Social History     Socioeconomic History    Marital status:    Tobacco Use    Smoking status: Never    Smokeless tobacco: Never   Vaping Use    Vaping Use: Never used   Substance and Sexual Activity    Alcohol use: Never    Drug use: Never   Other Topics Concern    Caffeine Concern No    Exercise Yes    Seat Belt No    Special Diet No    Stress Concern Yes    Weight Concern Yes       SURGICAL HISTORY:     Past Surgical History:   Procedure Laterality Date    Tibia fracture surgery Left        PHYSICAL EXAM:     Vitals:    23 1154   Weight: 220 lb (99.8 kg)   Height: 5' 7\" (1.702 m)         Physical Exam  Constitutional:       General: She is not in acute distress. Appearance: Normal appearance. She is not ill-appearing or toxic-appearing. HENT:      Head: Normocephalic and atraumatic. Eyes:      Extraocular Movements: Extraocular movements intact. Pulmonary:      Effort: Pulmonary effort is normal. No respiratory distress. Musculoskeletal:      Cervical back: Normal range of motion. Skin:     Coloration: Skin is not jaundiced. Findings: No lesion. Neurological:      Mental Status: She is alert and oriented to person, place, and time. Psychiatric:         Mood and Affect: Mood normal.         Behavior: Behavior normal.         Thought Content: Thought content normal.         Judgment: Judgment normal.          ASSESSMENT AND PLAN:     [facility] Encounter on 11/18/2023   Component Date Value Ref Range Status    Glucose 11/18/2023 91  70 - 99 mg/dL Final    Sodium 11/18/2023 138  136 - 145 mmol/L Final    Potassium 11/18/2023 4.0  3.5 - 5.1 mmol/L Final    Chloride 11/18/2023 106  98 - 112 mmol/L Final    CO2 11/18/2023 25.0  21.0 - 32.0 mmol/L Final    Anion Gap 11/18/2023 7  0 - 18 mmol/L Final    BUN 11/18/2023 14  9 - 23 mg/dL Final    Creatinine 11/18/2023 0.77  0.55 - 1.02 mg/dL Final    BUN/CREA Ratio 11/18/2023 18.2  10.0 - 20.0 Final    Calcium, Total 11/18/2023 9.7  8.7 - 10.4 mg/dL Final    Calculated Osmolality 11/18/2023 286  275 - 295 mOsm/kg Final    eGFR-Cr 11/18/2023 101  >=60 mL/min/1.73m2 Final    ALT 11/18/2023 15  10 - 49 U/L Final    AST 11/18/2023 18  <=34 U/L Final    Alkaline Phosphatase 11/18/2023 82  37 - 98 U/L Final    Bilirubin, Total 11/18/2023 0.8  0.3 - 1.2 mg/dL Final    Total Protein 11/18/2023 7.6  5.7 - 8.2 g/dL Final    Albumin 11/18/2023 4.7  3.2 - 4.8 g/dL Final    Globulin  11/18/2023 2.9  2.8 - 4.4 g/dL Final    A/G Ratio 11/18/2023 1.6  1.0 - 2.0 Final    Patient Fasting for CMP? 11/18/2023 Yes   Final    Cholesterol, Total 11/18/2023 172  <200 mg/dL Final    Desirable  <200 mg/dL  Borderline  200-239 mg/dL  High      >=240 mg/dL        HDL Cholesterol 11/18/2023 54  40 - 59 mg/dL Final    Interpretive Information:   An HDL cholesterol <40 mg/dL is low and constitutes a coronary heart disease risk factor. An HDL cholesterol >60 mg/dL is a negative risk factor for coronary heart disease.         Triglycerides 11/18/2023 60  30 - 149 mg/dL Final    Reference interval for fasting triglycerides  Desirable: <150 mg/dL  Borderline: 150-199 mg/dL  High: 200-499 mg/dL  Very High: >=500 mg/dL          LDL Cholesterol 11/18/2023 106 (H)  <100 mg/dL Final    Optimal            <100 mg/dL   Near/Above OptimaL 100-129 mg/dL   Borderline High    130-159 mg/dL   High               160-189 mg/dL    Very High          >190 mg/dL         VLDL 11/18/2023 10  0 - 30 mg/dL Final    Non HDL Chol 11/18/2023 118  <130 mg/dL Final    Desirable  <130 mg/dL   Borderline  130-159 mg/dL   High        160-189 mg/dL       Very high >=190 mg/dL        Patient Fasting for Lipid? 11/18/2023 Yes   Final    Insulin 11/18/2023 9.8  3.0 - 25.0 mU/L Final    HgbA1C 11/18/2023 5.7 (H)  <5.7 % Final     Normal HbA1C:     <5.7%      Pre-Diabetic:     5.7 - 6.4%      Diabetic:         >6.4%      Diabetic Control: <7.0%        Estimated Average Glucose 11/18/2023 117  68 - 126 mg/dL Final    eAG is the estimated average glucose calculated from Hgb A1c according to the formula recommended by the American Diabetes Association. eAG levels reflect the long term average glucose and may not correlate with random or fasting glucose levels since these represent specific points in time. TSH 11/18/2023 3.483  0.550 - 4.780 mIU/mL Final    Free T4 11/18/2023 1.3  0.8 - 1.7 ng/dL Final    Reverse T3 11/18/2023 20.3  9.2 - 24.1 ng/dL Final    T3 Free 11/18/2023 3.55  2.40 - 4.20 pg/mL Final    Magnesium RBC 11/18/2023 5.2  3.7 - 7.0 mg/dL Final    This test was developed and its performance characteristics  determined by Javid Forte. It has not been cleared or approved  by the Food and Drug Administration.                 **Please note reference interval change**    Vitamin B12 11/18/2023 445  211 - 911 pg/mL Final    Vitamin B12 Reference Range   Deficient:      <150 pg/mL   Indeterminate   150 - 211 pg/mL  Normal:         211 - 911 pg/mL       Folate (Folic Acid) 10/05/0933 >24.0  >5.4 ng/mL Final    This test may exhibit interference when a sample is collected from a person who is consuming high dose of biotin (a.k.a., vitamin B7, vitamin H, coenzyme R) supplements resulting in serum concentrations >50 ng/mL. Intake of the recommended daily allowance (RDA) for biotin (0.03 mg) has not been shown to typically cause significant interference; however, high dose daily dietary supplements may contain biotin concentrations greater than 150 times (5-10 mg) the RDA. It is recommended that physicians ask all patients who may be on biotin supplementation to stop biotin consumption at least 72 hours prior to collection of a new sample. [facility] 11/18/2023 12.7  No established range for female sex mIU/mL Final    Follicular phase:  1.9 - 12.5 mIU/ml  Midcycle:          8.7 - 76.3 mIU/ml  Luteal Phase:      0.5 - 16.9 mIU/ml  Postmenopausal:    5.0 - 55.2 mIU/ml  Pregnant:          < 0.1 - 1.5 mIU/ml      [facility] 11/18/2023 5.0  No established range for female sex mIU/mL Final    Testosterone Tot LC/MS 11/18/2023 31.3  10.0 - 55.0 ng/dL Final    Testost % Free+Weak Bnd 11/18/2023 15.9  3.0 - 18.0 % Final    This test was developed and its performance characteristics  determined by Mariia Burks. It has not been cleared or approved  by the Food and Drug Administration.     Testost Free+Weak Bnd 11/18/2023 5.0  0.0 - 9.5 ng/dL Final    Sex Horm Bind Glob 11/18/2023 35.3  24.6 - 122.0 nmol/L Final    Estrone 11/18/2023 57  27 - 231 pg/mL Final                                                  Range                      Adult (Premenopausal)    27 - 231                Menstrual Cycle (1-10 days)    19 - 149                Menstrual Cycle (11-20 days)   32 - 176                Menstrual Cycle (21-30 days)   37 - 200    Estradiol 11/18/2023 99.7  pg/mL Final                         Adult Female             Range                        Follicular phase     56.9 - 166.0                        Ovulation phase      85.8 - 498.0                        Luteal phase         43.8 - 211.0 Postmenopausal       <6.0 -  54.7                       Pregnancy    Progesterone 11/18/2023 13.16  No established range for female sex ng/mL Final    Non-Pregnant Females:   Follicular phase:    <= 1.4 ng/ml  Luteal phase:        3.34 - 25.56 ng/ml  Mid-luteal phase:    4.44 - 28.03 ng/ml  Postmenopausal:      <= 0.73 ng/ml    Pregnant Females:  First Trimester:     11.22 - 90.00 ng/ml  Second Trimester:    25.55 - 89.40 ng/ml  Third Trimester:     48.40 - 422.50 ng/ml      Ferritin 11/18/2023 2.1 (L)  12.0 - 160.0 ng/mL Final    Leptin 11/18/2023 63.9  ng/mL Final                 Female Ranges by Body Mass Index (BMI)             BMI        Range        BMI         Range             11     0.7 -  3.6        24      4.4 -  24.2             12     0.8 -  4.2        25      5.1 -  28.0             13     0.9 -  4.8        26      5.9 -  32.4             14     1.0 -  5.6        27      6.8 -  37.5    WBC 11/18/2023 6.4  4.0 - 11.0 x10(3) uL Final    RBC 11/18/2023 5.15  3.80 - 5.30 x10(6)uL Final    HGB 11/18/2023 12.8  12.0 - 16.0 g/dL Final    HCT 11/18/2023 40.2  35.0 - 48.0 % Final    MCV 11/18/2023 78.1 (L)  80.0 - 100.0 fL Final    MCH 11/18/2023 24.9 (L)  26.0 - 34.0 pg Final    MCHC 11/18/2023 31.8  31.0 - 37.0 g/dL Final    RDW-SD 11/18/2023 43.8  35.1 - 46.3 fL Final    RDW 11/18/2023 15.5 (H)  11.0 - 15.0 % Final    PLT 11/18/2023 227.0  150.0 - 450.0 10(3)uL Final    Neutrophil Absolute Prelim 11/18/2023 4.17  1.50 - 7.70 x10 (3) uL Final    Neutrophil Absolute 11/18/2023 4.17  1.50 - 7.70 x10(3) uL Final    Lymphocyte Absolute 11/18/2023 1.57  1.00 - 4.00 x10(3) uL Final    Monocyte Absolute 11/18/2023 0.43  0.10 - 1.00 x10(3) uL Final    Eosinophil Absolute 11/18/2023 0.16  0.00 - 0.70 x10(3) uL Final    Basophil Absolute 11/18/2023 0.03  0.00 - 0.20 x10(3) uL Final    Immature Granulocyte Absolute 11/18/2023 0.02  0.00 - 1.00 x10(3) uL Final    Neutrophil % 11/18/2023 65.4  % Final    Lymphocyte % 11/18/2023 24.6  % Final    Monocyte % 11/18/2023 6.7  % Final    Eosinophil % 11/18/2023 2.5  % Final    Basophil % 11/18/2023 0.5  % Final    Immature Granulocyte % 11/18/2023 0.3  % Final    Slide Review 11/18/2023 Platelets reviewed on smear. No giant platelets or platelet clumps observed. Final   Office Visit on 10/25/2023   Component Date Value Ref Range Status    HPV High Risk 10/25/2023 Negative  Negative Final    HPV Source 10/25/2023 Cervical/endocervical   Final    Interpretation/Result 10/25/2023 Negative for intraepithelial lesion or malignancy  Negative for intraepithelial lesion or malignancy Final    Specimen Adequacy 10/25/2023 Satisfactory for evaluation. No endocervical or metaplastic cells seen  Partially obscuring inflammation present   Final    General Categorization 10/25/2023 Negative for intraepithelial lesion or malignancy     Final    Recommendations/Comments 10/25/2023    Final                    Value: This result contains rich text formatting which cannot be displayed here. Procedure 10/25/2023    Final                    Value: This result contains rich text formatting which cannot be displayed here. Clinical Information 10/25/2023    Final                    Value: This result contains rich text formatting which cannot be displayed here. Reason for testing 10/25/2023 Screening, High Risk   Final    Gyn Additional Information 10/25/2023    Final                    Value: This result contains rich text formatting which cannot be displayed here.     Case Report 10/25/2023    Final                    Value:Gynecologic Cytology                              Case: [id number]                                  Authorizing Provider:  Mitch Hay PA-C    Collected:           10/25/2023 01:20 PM          Ordering Location:     [facility]    Received:            10/26/2023 11:19 AM                                 [facility], [address] [place] [address] , [place]                                                                     First Screen:          Marva Rosales                                                               Rescreen:              Carlo Hutton                                                               Specimen: ThinPrep Imager Screening Pap, Cervical/endocervical/vaginal                                 No results found. 1. Low ferritin level    2. Other fatigue    3. BMI 35.0-35.9,adult    4. Prediabetes    5. Abdominal bloating    6. Flatulence    This visit was conducted using Telemedicine with live, interactive video and audio. The patient understands the risks and benefits of Telemedicine and that a Telemedicine visit limits the ability to perform a thorough physical examination which may affect objective findings related to specific symptoms and conditions which can, in turn, affect treatment. The patient was located in the state of [place] at the time of the encounter. Time spent with patient: Over 30 minutes spent in chart review and in direct communication with patient obtaining and reviewing history, creating a unique care plan, explaining the rationale for treatment, reviewing potential SE and overall treatment plan,  documenting all clinical information in Epic. Over 50% of this time was in education, counseling and coordination of care. Problem List Items Addressed This Visit    None  Visit Diagnoses       Low ferritin level    -  Primary    Other fatigue        BMI 35.0-35.9,adult        Prediabetes        Abdominal bloating        Flatulence        Relevant Medications    ferrous sulfate 325 (65 FE) MG Oral Tab EC          Significantly low ferritin -fatigue noticeable in the afternoons. She did start on iron supplementation 2 weeks ago. Will check iron levels next week. GI -Long history of bloating and gas, denies abdominal pain. Labs to check celiac markers. After blood test, did discuss removing gluten from diet, and then potentially dairy. Patient is agreeable to this as her daughter had to remove gluten as well. Slight concern for H. pylori as her daughter tested positive. Will test for that as she does have a family history of gastric cancer. Consider comprehensive stool test vs food sensitivity testing if symptoms persist after eliminating the above foods. Weight gain-elevated leptin markers and high levels of hunger suggest leptin resistance. Hemoglobin A1c at 5.7. Reduction of gluten and carbohydrates should help to reduce this. Add in leptin manager as directed below. Follow-up in 2 to 3 months. Given further recommendations as below    Orders Placed This Visit:  No orders of the defined types were placed in this encounter. No orders of the defined types were placed in this encounter. Patient Instructions   Consider elimination diet - handouts will be mailed to you. Gluten and Dairy would be the biggest triggers, typically. Leptin Manager by Fred Aparicio and the Genetic Expression of Adipogenic Marker      Genes in Multipotent Cells   Affects Synovial Fluid and Serum Leptin Levels   Supports Weight Loss    DIRECTIONS: Take one capsule in the morning     3. Increase iron to 1 tablet twice daily. 4. Labs to be drawn in 1week (do not need to be fasting)    No follow-ups on file. Patient affirmed understanding of plan and all questions were answered.      Pablo Ocampo PA-C

## 2024-01-05 ENCOUNTER — LAB ENCOUNTER (OUTPATIENT)
Dept: LAB | Age: 39
End: 2024-01-05
Attending: PHYSICIAN ASSISTANT
Payer: COMMERCIAL

## 2024-01-05 DIAGNOSIS — Z00.00 ENCOUNTER FOR WELLNESS EXAMINATION IN ADULT: ICD-10-CM

## 2024-01-05 DIAGNOSIS — R79.89 LOW VITAMIN D LEVEL: ICD-10-CM

## 2024-01-05 DIAGNOSIS — R53.83 OTHER FATIGUE: ICD-10-CM

## 2024-01-05 DIAGNOSIS — R79.0 LOW FERRITIN LEVEL: ICD-10-CM

## 2024-01-05 DIAGNOSIS — R14.0 ABDOMINAL BLOATING: ICD-10-CM

## 2024-01-05 DIAGNOSIS — R14.3 FLATULENCE: ICD-10-CM

## 2024-01-05 LAB
BASOPHILS # BLD AUTO: 0.03 X10(3) UL (ref 0–0.2)
BASOPHILS NFR BLD AUTO: 0.4 %
DEPRECATED HBV CORE AB SER IA-ACNC: 12.3 NG/ML
DEPRECATED RDW RBC AUTO: 53.5 FL (ref 35.1–46.3)
EOSINOPHIL # BLD AUTO: 0.12 X10(3) UL (ref 0–0.7)
EOSINOPHIL NFR BLD AUTO: 1.7 %
ERYTHROCYTE [DISTWIDTH] IN BLOOD BY AUTOMATED COUNT: 18.4 % (ref 11–15)
HCT VFR BLD AUTO: 42.3 %
HGB BLD-MCNC: 13.4 G/DL
IGA SERPL-MCNC: 163.7 MG/DL (ref 70–312)
IMM GRANULOCYTES # BLD AUTO: 0.01 X10(3) UL (ref 0–1)
IMM GRANULOCYTES NFR BLD: 0.1 %
IRON SATN MFR SERPL: 16 %
IRON SERPL-MCNC: 79 UG/DL
LYMPHOCYTES # BLD AUTO: 1.75 X10(3) UL (ref 1–4)
LYMPHOCYTES NFR BLD AUTO: 24.6 %
MCH RBC QN AUTO: 25.7 PG (ref 26–34)
MCHC RBC AUTO-ENTMCNC: 31.7 G/DL (ref 31–37)
MCV RBC AUTO: 81 FL
MONOCYTES # BLD AUTO: 0.46 X10(3) UL (ref 0.1–1)
MONOCYTES NFR BLD AUTO: 6.5 %
NEUTROPHILS # BLD AUTO: 4.75 X10 (3) UL (ref 1.5–7.7)
NEUTROPHILS # BLD AUTO: 4.75 X10(3) UL (ref 1.5–7.7)
NEUTROPHILS NFR BLD AUTO: 66.7 %
PLATELET # BLD AUTO: 260 10(3)UL (ref 150–450)
RBC # BLD AUTO: 5.22 X10(6)UL
TIBC SERPL-MCNC: 492 UG/DL (ref 250–425)
TRANSFERRIN SERPL-MCNC: 330 MG/DL (ref 250–380)
VIT D+METAB SERPL-MCNC: 31.7 NG/ML (ref 30–100)
VIT D+METAB SERPL-MCNC: 36.4 NG/ML (ref 30–100)
WBC # BLD AUTO: 7.1 X10(3) UL (ref 4–11)

## 2024-01-05 PROCEDURE — 85025 COMPLETE CBC W/AUTO DIFF WBC: CPT

## 2024-01-05 PROCEDURE — 82784 ASSAY IGA/IGD/IGG/IGM EACH: CPT

## 2024-01-05 PROCEDURE — 84466 ASSAY OF TRANSFERRIN: CPT

## 2024-01-05 PROCEDURE — 81377 HLA II TYPE 1 AG EQUIV LR: CPT

## 2024-01-05 PROCEDURE — 82728 ASSAY OF FERRITIN: CPT

## 2024-01-05 PROCEDURE — 86628 CANDIDA ANTIBODY: CPT

## 2024-01-05 PROCEDURE — 82306 VITAMIN D 25 HYDROXY: CPT

## 2024-01-05 PROCEDURE — 36415 COLL VENOUS BLD VENIPUNCTURE: CPT

## 2024-01-05 PROCEDURE — 86364 TISS TRNSGLTMNASE EA IG CLAS: CPT

## 2024-01-05 PROCEDURE — 83540 ASSAY OF IRON: CPT

## 2024-01-08 LAB — TTG IGA SER-ACNC: 0.6 U/ML (ref ?–7)

## 2024-01-10 LAB
CANDIDA IGA AB: NEGATIVE
CANDIDA IGG AB: NEGATIVE
CANDIDA IGM AB IGM: NEGATIVE

## 2024-01-16 LAB
DQ2 (DQA1 0501/0505,DQB1 02XX): NEGATIVE
DQ8 (DQA1 03XX, DQB1 0302): NEGATIVE

## 2024-02-04 ENCOUNTER — HOSPITAL ENCOUNTER (OUTPATIENT)
Dept: ULTRASOUND IMAGING | Age: 39
Discharge: HOME | End: 2024-02-04
Attending: FAMILY MEDICINE
Payer: COMMERCIAL

## 2024-02-04 ENCOUNTER — HOSPITAL ENCOUNTER (OUTPATIENT)
Dept: ULTRASOUND IMAGING | Age: 39
End: 2024-02-04
Attending: FAMILY MEDICINE
Payer: COMMERCIAL

## 2024-02-04 DIAGNOSIS — N92.1 MENORRHAGIA WITH IRREGULAR CYCLE: ICD-10-CM

## 2024-02-04 PROCEDURE — 76830 TRANSVAGINAL US NON-OB: CPT | Performed by: FAMILY MEDICINE

## 2024-02-04 PROCEDURE — 76856 US EXAM PELVIC COMPLETE: CPT | Performed by: FAMILY MEDICINE

## 2024-02-07 NOTE — PROGRESS NOTES
Renetta Ren    My name is Hannah Landrum PA-C I work with Nanda and with Dr. Skelton when he was here.     I reviewed your ultrasound and there was nothing concerning found.    Hannah Landrum PA-C

## 2024-02-21 ENCOUNTER — TELEMEDICINE (OUTPATIENT)
Dept: INTEGRATIVE MEDICINE | Facility: CLINIC | Age: 39
End: 2024-02-21
Payer: COMMERCIAL

## 2024-02-21 DIAGNOSIS — R14.0 ABDOMINAL BLOATING: ICD-10-CM

## 2024-02-21 DIAGNOSIS — R53.83 OTHER FATIGUE: ICD-10-CM

## 2024-02-21 DIAGNOSIS — R73.03 PREDIABETES: Primary | ICD-10-CM

## 2024-02-21 DIAGNOSIS — R79.89 LOW VITAMIN D LEVEL: ICD-10-CM

## 2024-02-21 DIAGNOSIS — R79.0 LOW FERRITIN LEVEL: ICD-10-CM

## 2024-02-21 DIAGNOSIS — E34.8 ENDOCRINE AXIS DYSFUNCTION: ICD-10-CM

## 2024-02-21 PROCEDURE — 99215 OFFICE O/P EST HI 40 MIN: CPT | Performed by: PHYSICIAN ASSISTANT

## 2024-02-21 NOTE — PROGRESS NOTES
Gela Bro is a 38 year old female.  Chief Complaint   Patient presents with    Follow - Up     Lab results       HPI:   39yo female presents today via video for f/u on labs.    Last labs 1/5/24 - low ferritin and Vit D.   Taking 5000 IU's and vitamin D since January.    Low ferritin - she feels it could be secondary to digestive issues. Did not yet increase Hemagenics to TID after low results in Jan labs - was afraid of taking extra due to B12 and Vit C and cu.   Menses heavy first couple days.     Had ultrasound (2/4/24) due to significant fam h/o ovarian and breast cancer - was normal.    Fatigue in afternoon and mood dips.     Struggles with weight loss.   Did not take the leptin manager yet.   Joined gym and going to workout classes: spin and strength training.     Does a lot of sitting at computer for work.     GI - Has always had GI issues since childhood.   Does have a lot of bloating and gas, but depends on what she eats.  Good BM daily.   Has tried to remove gluten, but not completely.   Takes Therbiotic by [facility].     (Daughter has a gluten sensitivity, ADHD, and tested positive for H. pylori.)  Celiac testing in January labs was negative.  Denies abdominal pain, melena or hematochezia.   No colonoscopies to date.  Family history of gastric cancer, no colon cancer.    See Below for pertinent lifestyle factors.      REVIEW OF SYSTEMS:   Review of Systems   Constitutional:  Positive for fatigue. Negative for chills and fever.   Eyes: Negative.  Negative for visual disturbance.   Respiratory: Negative.  Negative for cough, shortness of breath and wheezing.    Cardiovascular: Negative.  Negative for chest pain.   Gastrointestinal:  Negative for abdominal pain, blood in stool, constipation, diarrhea, nausea and vomiting.        Bloating and gas   Endocrine: Negative.    Genitourinary: Negative.    Musculoskeletal: Negative.    Skin: Negative.    Allergic/Immunologic: Negative.    Neurological:  Negative.  Negative for weakness and headaches.   Hematological: Negative.    Psychiatric/Behavioral: Negative.              FAMILY HISTORY:      Family History   Problem Relation Age of Onset    Cancer Father     Hypertension Father     Hypertension Mother     Cancer Maternal Grandmother     Cancer Maternal Grandfather     Cancer Paternal Grandmother     Heart Disorder Paternal Grandfather     Hypertension Brother      Breast and ovarian ca - 2 aunts  at 51yo  MGM stomach cancer    Father, stage 4 prostate cancer  Mother, healthy  MEDICAL HISTORY:   History reviewed. No pertinent past medical history.    CURRENT MEDICATIONS:     Current Outpatient Medications   Medication Sig Dispense Refill    ferrous sulfate 325 (65 FE) MG Oral Tab EC Take 1 tablet (325 mg total) by mouth daily with breakfast.         SOCIAL HISTORY:   Lifestyle Factors affecting health:  Diet - cooks at home, whole foods, limits processed foods. Before menses gets increased cravings. Tolerates dairy, but does have very little - greek yogurt and some cheese.    Exercise - moving a lot during the day, see above    Stress - high, exercise helps to reduce    Sleep - good, falls asleep easily by 10pm      Son in , daughter 6yo    Social History     Socioeconomic History    Marital status:    Tobacco Use    Smoking status: Never    Smokeless tobacco: Never   Vaping Use    Vaping Use: Never used   Substance and Sexual Activity    Alcohol use: Never    Drug use: Never   Other Topics Concern    Caffeine Concern No    Exercise Yes    Seat Belt No    Special Diet No    Stress Concern Yes    Weight Concern Yes       SURGICAL HISTORY:     Past Surgical History:   Procedure Laterality Date    Tibia fracture surgery Left        PHYSICAL EXAM:     There were no vitals filed for this visit.        Physical Exam  Constitutional:       General: She is not in acute distress.     Appearance: Normal appearance. She is not  ill-appearing or toxic-appearing.   HENT:      Head: Normocephalic and atraumatic.   Eyes:      Extraocular Movements: Extraocular movements intact.   Pulmonary:      Effort: Pulmonary effort is normal. No respiratory distress.   Musculoskeletal:      Cervical back: Normal range of motion.   Skin:     Coloration: Skin is not jaundiced.      Findings: No lesion.   Neurological:      Mental Status: She is alert and oriented to person, place, and time.   Psychiatric:         Mood and Affect: Mood normal.         Behavior: Behavior normal.         Thought Content: Thought content normal.         Judgment: Judgment normal.          ASSESSMENT AND PLAN:     [facility] Lab Encounter on 01/05/2024   Component Date Value Ref Range Status    DQ2 (DQA1 0501/0505,DQB1 02XX) 01/05/2024 Negative   Final    DQ8 (DQA1 03XX, DQB1 0302) 01/05/2024 Negative   Final    Comment: Final Results:  DQB1*03:BENJA,06:EETHEA  DQA1*01:LORENA,05:LIZZY  Code Translation:  LORENA            01:02/01:05/01:08/01:09/01:11/01:16N/01:19                   /01:20/01:21/01:23/01:25/01:31/01:32/01:39                   /01:40Q/01:41/01:42/01:46/01:51/01:63                   /01:73/01:75/01:85/01:90/01:92/01:93/01:94                   /01:100  LIZZY            05:01/05:05/05:09/05:11/05:13/05:14/05:17N                   /05:20/05:24/05:25/05:29Q/05:35/05:39                   /05:43/05:44/05:46/05:48/05:50/05:51/05:53                   /05:58/05:60/05:62  EESCKARLY            09/42/88/142/189/281/282/287/299/339/348                   /420/432  EFVSZ            03:01/03:22/03:27/03:28/03:29/03:35/03:42                   /03:44/03:46/03:47/03:49/03:50/03:51/03:55                   /03:73/03:83/03:84N/03:92/03:93/03:94                   /03:114/03:115/03:116/03:119/03:165/03:169                   /03:182/03:191/03:196/03:197Q/03:198                                              /03:206/03:241/03:242/03:243/03:246/03:252                   /03:253/03:254/03:266/03:276N/03:281                    /03:284/03:285/03:288/03:290/03:291/03:292                   /03:293/03:294/03:297/03:302/03:303N                   /03:305/03:306/03:309/03:311/03:312/03:314                   /03:317/03:326/03:328/03:329/03:330/03:331                   /03:338N/03:340N/03:342/03:350/03:354N                   /03:358N/03:361/03:370/03:372/03:377                   /03:378/03:391/03:396/03:399N/03:400N                   /03:404/03:407N/03:408/03:418/03:419                   /03:420/03:421/03:423/03:424/03:426/03:430                   /03:431/03:432/03:434/03:435/03:436/03:439                   /03:448/03:451/03:454/03:460/03:465/03:467                   /03:468/03:472/03:473N/03:475/03:476                   /03:480Q/03:482/03:483/03:486/03:491  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  The Celiac Disease risk from the HLA DQA/DQB  genotype is approximately                            1:1842 (0.05%).  This is less  than the 1% risk in the general population.  Allele interpretation for all loci based on IMGT/HLA  database version 3.49.0  HLA Lab [place] ID Number [id number]  Greater than 95% of celiac patients are positive for either DQ2 or DQ8  (Carlo and Jose Angel, (1993)  Gastroenterology 105:910-922). However  these antigens may also be present in patients who do not have Celiac  disease.    Comment Celiac HLA 01/05/2024 Comment   Final    This test was performed using Polymerase Chain Reaction (PCR) and  Sequence Specific Oligonucleotide Probes (SSOP) (ecoATM) technique.  Sequence Based Typing (SBT) may be used as a supplemental method  when necessary.  If you have questions, please call HLA customer service at  [phone] or email at [email or web address].    Addit Info  Celiac HLA 01/05/2024 Comment   Final    References:  1. Terrence BRASWELL and Flynn THAO. Celiac Disease. N Eng J Med 2007;     357:7005-7507.  2. Jessica HAN, Sandy MCDONOUGH, Bonamico M et al. HLA-DQ and risk gradient     for celiac  disease. Hum Immunol 2009; 70:55-59.  3. Raymon MM, Brenden TC, Thalia FM et al. Stratifying risk     for celiac disease in a large at-risk [place] population     by using HLA alleles. Clin Gastroenterol Hepatol 2009; 7:966-971.  4. Carlo COULTER and Jodi BA. (2005). Celiac Disease Genetics: Current     Concepts and Practical Applications. Clin Gastroenterol and     Hepat 3:843-851.  5. Fabián CL, Alonso DO, Terrence PHR, et al. Celiac Disease.     In: Zackery RA, Tito TC, Kaden CR, Tracy K, editors. [employer] [place]), [facility], [place], July 3, 2008:1-27.     http://www.ncbi.nlm.nih.gov/bookshelf/br.fcgi?book=genepart=celiac     PMID 86823450 (PubMed)  6. Monica NIXON. Emerging concepts in celiac disease. Curr Opin Pediatr     2004;16:552-559.    Iron 01/05/2024 79  50 - 170 ug/dL Final    Transferrin 01/05/2024 330  250 - 380 mg/dL Final    Total Iron Binding Capacity 01/05/2024 492 (H)  250 - 425 ug/dL Final    % Saturation 01/05/2024 16  15 - 50 % Final    Ferritin 01/05/2024 12.3  12.0 - 160.0 ng/mL Final    Immunoglobulin A 01/05/2024 163.70  70.00 - 312.00 mg/dL Final    Candida IgG Ab 01/05/2024 Negative  Negative Final    Candida IgM Ab IgM 01/05/2024 Negative  Negative Final                  **Please note reference interval change**    Candida IgA Ab 01/05/2024 Negative  Negative Final                  **Please note reference interval change**    Vitamin D, 25OH, Total 01/05/2024 36.4  30.0 - 100.0 ng/mL Final    Literature Recommendations for 25(OH)D levels are:  Range           Vitamin D Status   <20    ng/mL      Deficiency   20-<30 ng/mL      Insufficiency    ng/mL      Sufficiency   >100   ng/mL      Toxicity    *Clinical controversy exists regarding optimal 25(OH)D levels. Emerging evidence links potential adverse effects to high levels, particularly >60 ng/mL.        Vitamin D, 25OH, Total 01/05/2024 31.7  30.0 - 100.0 ng/mL Final    Literature Recommendations for  25(OH)D levels are:  Range           Vitamin D Status   <20    ng/mL      Deficiency   20-<30 ng/mL      Insufficiency    ng/mL      Sufficiency   >100   ng/mL      Toxicity    *Clinical controversy exists regarding optimal 25(OH)D levels. Emerging evidence links potential adverse effects to high levels, particularly >60 ng/mL.        WBC 01/05/2024 7.1  4.0 - 11.0 x10(3) uL Final    RBC 01/05/2024 5.22  3.80 - 5.30 x10(6)uL Final    HGB 01/05/2024 13.4  12.0 - 16.0 g/dL Final    HCT 01/05/2024 42.3  35.0 - 48.0 % Final    MCV 01/05/2024 81.0  80.0 - 100.0 fL Final    MCH 01/05/2024 25.7 (L)  26.0 - 34.0 pg Final    MCHC 01/05/2024 31.7  31.0 - 37.0 g/dL Final    RDW-SD 01/05/2024 53.5 (H)  35.1 - 46.3 fL Final    RDW 01/05/2024 18.4 (H)  11.0 - 15.0 % Final    PLT 01/05/2024 260.0  150.0 - 450.0 10(3)uL Final    Neutrophil Absolute Prelim 01/05/2024 4.75  1.50 - 7.70 x10 (3) uL Final    Neutrophil Absolute 01/05/2024 4.75  1.50 - 7.70 x10(3) uL Final    Lymphocyte Absolute 01/05/2024 1.75  1.00 - 4.00 x10(3) uL Final    Monocyte Absolute 01/05/2024 0.46  0.10 - 1.00 x10(3) uL Final    Eosinophil Absolute 01/05/2024 0.12  0.00 - 0.70 x10(3) uL Final    Basophil Absolute 01/05/2024 0.03  0.00 - 0.20 x10(3) uL Final    Immature Granulocyte Absolute 01/05/2024 0.01  0.00 - 1.00 x10(3) uL Final    Neutrophil % 01/05/2024 66.7  % Final    Lymphocyte % 01/05/2024 24.6  % Final    Monocyte % 01/05/2024 6.5  % Final    Eosinophil % 01/05/2024 1.7  % Final    Basophil % 01/05/2024 0.4  % Final    Immature Granulocyte % 01/05/2024 0.1  % Final    Tissue Transglutaminase IgA Ab 01/05/2024 0.6  <7.0 U/mL Final   [facility] Lab Encounter on 11/18/2023   Component Date Value Ref Range Status    Glucose 11/18/2023 91  70 - 99 mg/dL Final    Sodium 11/18/2023 138  136 - 145 mmol/L Final    Potassium 11/18/2023 4.0  3.5 - 5.1 mmol/L Final    Chloride 11/18/2023 106  98 - 112 mmol/L Final    CO2 11/18/2023 25.0  21.0 - 32.0 mmol/L  Final    Anion Gap 11/18/2023 7  0 - 18 mmol/L Final    BUN 11/18/2023 14  9 - 23 mg/dL Final    Creatinine 11/18/2023 0.77  0.55 - 1.02 mg/dL Final    BUN/CREA Ratio 11/18/2023 18.2  10.0 - 20.0 Final    Calcium, Total 11/18/2023 9.7  8.7 - 10.4 mg/dL Final    Calculated Osmolality 11/18/2023 286  275 - 295 mOsm/kg Final    eGFR-Cr 11/18/2023 101  >=60 mL/min/1.73m2 Final    ALT 11/18/2023 15  10 - 49 U/L Final    AST 11/18/2023 18  <=34 U/L Final    Alkaline Phosphatase 11/18/2023 82  37 - 98 U/L Final    Bilirubin, Total 11/18/2023 0.8  0.3 - 1.2 mg/dL Final    Total Protein 11/18/2023 7.6  5.7 - 8.2 g/dL Final    Albumin 11/18/2023 4.7  3.2 - 4.8 g/dL Final    Globulin  11/18/2023 2.9  2.8 - 4.4 g/dL Final    A/G Ratio 11/18/2023 1.6  1.0 - 2.0 Final    Patient Fasting for CMP? 11/18/2023 Yes   Final    Cholesterol, Total 11/18/2023 172  <200 mg/dL Final    Desirable  <200 mg/dL  Borderline  200-239 mg/dL  High      >=240 mg/dL        HDL Cholesterol 11/18/2023 54  40 - 59 mg/dL Final    Interpretive Information:   An HDL cholesterol <40 mg/dL is low and constitutes a coronary heart disease risk factor. An HDL cholesterol >60 mg/dL is a negative risk factor for coronary heart disease.        Triglycerides 11/18/2023 60  30 - 149 mg/dL Final    Reference interval for fasting triglycerides  Desirable: <150 mg/dL  Borderline: 150-199 mg/dL  High: 200-499 mg/dL  Very High: >=500 mg/dL          LDL Cholesterol 11/18/2023 106 (H)  <100 mg/dL Final    Optimal            <100 mg/dL   Near/Above OptimaL 100-129 mg/dL   Borderline High    130-159 mg/dL   High               160-189 mg/dL    Very High          >190 mg/dL         VLDL 11/18/2023 10  0 - 30 mg/dL Final    Non HDL Chol 11/18/2023 118  <130 mg/dL Final    Desirable  <130 mg/dL   Borderline  130-159 mg/dL   High        160-189 mg/dL       Very high >=190 mg/dL        Patient Fasting for Lipid? 11/18/2023 Yes   Final    Insulin 11/18/2023 9.8  3.0 - 25.0 mU/L  Final    HgbA1C 11/18/2023 5.7 (H)  <5.7 % Final     Normal HbA1C:     <5.7%      Pre-Diabetic:     5.7 - 6.4%      Diabetic:         >6.4%      Diabetic Control: <7.0%        Estimated Average Glucose 11/18/2023 117  68 - 126 mg/dL Final    eAG is the estimated average glucose calculated from Hgb A1c according to the formula recommended by the American Diabetes Association. eAG levels reflect the long term average glucose and may not correlate with random or fasting glucose levels since these represent specific points in time.           TSH 11/18/2023 3.483  0.550 - 4.780 mIU/mL Final    Free T4 11/18/2023 1.3  0.8 - 1.7 ng/dL Final    Reverse T3 11/18/2023 20.3  9.2 - 24.1 ng/dL Final    T3 Free 11/18/2023 3.55  2.40 - 4.20 pg/mL Final    Magnesium RBC 11/18/2023 5.2  3.7 - 7.0 mg/dL Final    This test was developed and its performance characteristics  determined by [facility]. It has not been cleared or approved  by the Food and Drug Administration.                **Please note reference interval change**    Vitamin B12 11/18/2023 445  211 - 911 pg/mL Final    Vitamin B12 Reference Range   Deficient:      <150 pg/mL   Indeterminate   150 - 211 pg/mL  Normal:         211 - 911 pg/mL       Folate (Folic Acid) 11/18/2023 >24.0  >5.4 ng/mL Final    This test may exhibit interference when a sample is collected from a person who is consuming high dose of biotin (a.k.a., vitamin B7, vitamin H, coenzyme R) supplements resulting in serum concentrations >50 ng/mL.  Intake of the recommended daily allowance (RDA) for biotin (0.03 mg) has not been shown to typically cause significant interference; however, high dose daily dietary supplements may contain biotin concentrations greater than 150 times (5-10 mg) the RDA.  It is recommended that physicians ask all patients who may be on biotin supplementation to stop biotin consumption at least 72 hours prior to collection of a new sample.      LH 11/18/2023 12.7  No established  range for female sex mIU/mL Final    Follicular phase:  1.9 - 12.5 mIU/ml  Midcycle:          8.7 - 76.3 mIU/ml  Luteal Phase:      0.5 - 16.9 mIU/ml  Postmenopausal:    5.0 - 55.2 mIU/ml  Pregnant:          < 0.1 - 1.5 mIU/ml      FSH 11/18/2023 5.0  No established range for female sex mIU/mL Final    Testosterone Tot LC/MS 11/18/2023 31.3  10.0 - 55.0 ng/dL Final    Testost % Free+Weak Bnd 11/18/2023 15.9  3.0 - 18.0 % Final    This test was developed and its performance characteristics  determined by [facility]. It has not been cleared or approved  by the Food and Drug Administration.    Testost Free+Weak Bnd 11/18/2023 5.0  0.0 - 9.5 ng/dL Final    Sex Horm Bind Glob 11/18/2023 35.3  24.6 - 122.0 nmol/L Final    Estrone 11/18/2023 57  27 - 231 pg/mL Final                                                  Range                      Adult (Premenopausal)    27 - 231                Menstrual Cycle (1-10 days)    19 - 149                Menstrual Cycle (11-20 days)   32 - 176                Menstrual Cycle (21-30 days)   37 - 200    Estradiol 11/18/2023 99.7  pg/mL Final                         Adult Female             Range                        Follicular phase     12.5 - 166.0                        Ovulation phase      85.8 - 498.0                        Luteal phase         43.8 - 211.0                        Postmenopausal       <6.0 -  54.7                       Pregnancy    Progesterone 11/18/2023 13.16  No established range for female sex ng/mL Final    Non-Pregnant Females:  Follicular phase:    <= 1.4 ng/ml  Luteal phase:        3.34 - 25.56 ng/ml  Mid-luteal phase:    4.44 - 28.03 ng/ml  Postmenopausal:      <= 0.73 ng/ml    Pregnant Females:  First Trimester:     11.22 - 90.00 ng/ml  Second Trimester:    25.55 - 89.40 ng/ml  Third Trimester:     48.40 - 422.50 ng/ml      Ferritin 11/18/2023 2.1 (L)  12.0 - 160.0 ng/mL Final    Leptin 11/18/2023 63.9  ng/mL Final                 Female Ranges by Body Mass  Index (BMI)             BMI        Range        BMI         Range             11     0.7 -  3.6        24      4.4 -  24.2             12     0.8 -  4.2        25      5.1 -  28.0             13     0.9 -  4.8        26      5.9 -  32.4             14     1.0 -  5.6        27      6.8 -  37.5    WBC 11/18/2023 6.4  4.0 - 11.0 x10(3) uL Final    RBC 11/18/2023 5.15  3.80 - 5.30 x10(6)uL Final    HGB 11/18/2023 12.8  12.0 - 16.0 g/dL Final    HCT 11/18/2023 40.2  35.0 - 48.0 % Final    MCV 11/18/2023 78.1 (L)  80.0 - 100.0 fL Final    MCH 11/18/2023 24.9 (L)  26.0 - 34.0 pg Final    MCHC 11/18/2023 31.8  31.0 - 37.0 g/dL Final    RDW-SD 11/18/2023 43.8  35.1 - 46.3 fL Final    RDW 11/18/2023 15.5 (H)  11.0 - 15.0 % Final    PLT 11/18/2023 227.0  150.0 - 450.0 10(3)uL Final    Neutrophil Absolute Prelim 11/18/2023 4.17  1.50 - 7.70 x10 (3) uL Final    Neutrophil Absolute 11/18/2023 4.17  1.50 - 7.70 x10(3) uL Final    Lymphocyte Absolute 11/18/2023 1.57  1.00 - 4.00 x10(3) uL Final    Monocyte Absolute 11/18/2023 0.43  0.10 - 1.00 x10(3) uL Final    Eosinophil Absolute 11/18/2023 0.16  0.00 - 0.70 x10(3) uL Final    Basophil Absolute 11/18/2023 0.03  0.00 - 0.20 x10(3) uL Final    Immature Granulocyte Absolute 11/18/2023 0.02  0.00 - 1.00 x10(3) uL Final    Neutrophil % 11/18/2023 65.4  % Final    Lymphocyte % 11/18/2023 24.6  % Final    Monocyte % 11/18/2023 6.7  % Final    Eosinophil % 11/18/2023 2.5  % Final    Basophil % 11/18/2023 0.5  % Final    Immature Granulocyte % 11/18/2023 0.3  % Final    Slide Review 11/18/2023 Platelets reviewed on smear. No giant platelets or platelet clumps observed.   Final   Office Visit on 10/25/2023   Component Date Value Ref Range Status    HPV High Risk 10/25/2023 Negative  Negative Final    HPV Source 10/25/2023 Cervical/endocervical   Final    Interpretation/Result 10/25/2023 Negative for intraepithelial lesion or malignancy  Negative for intraepithelial lesion or malignancy  Final    Specimen Adequacy 10/25/2023 Satisfactory for evaluation. No endocervical or metaplastic cells seen  Partially obscuring inflammation present   Final    General Categorization 10/25/2023 Negative for intraepithelial lesion or malignancy     Final    Recommendations/Comments 10/25/2023    Final                    Value:This result contains rich text formatting which cannot be displayed here.    Procedure 10/25/2023    Final                    Value:This result contains rich text formatting which cannot be displayed here.    Clinical Information 10/25/2023    Final                    Value:This result contains rich text formatting which cannot be displayed here.    Reason for testing 10/25/2023 Screening, High Risk   Final    Gyn Additional Information 10/25/2023    Final                    Value:This result contains rich text formatting which cannot be displayed here.    Case Report 10/25/2023    Final                    Value:Gynecologic Cytology                              Case: [id number]                                  Authorizing Provider:  Nanda Casey PA-C    Collected:           10/25/2023 01:20 PM          Ordering Location:     [facility]    Received:            10/26/2023 11:19 AM                                 Group, [place] [facility],                                                                             [place]                                                                     First Screen:          Konstantin Troncoso                                                               Rescreen:              Fartun Kellogg                                                               Specimen:    ThinPrep Imager Screening Pap, Cervical/endocervical/vaginal                                 US PELVIS W EV (CPT=76856/63056)    Result Date: 2/4/2024  CONCLUSION:  Normal   LOCATION:  [place]   Dictated by (CST): Galen Lopez MD on 2/04/2024 at 12:15 PM     Finalized by (CST):  Galen Lopez MD on 2/04/2024 at 12:15 PM        1. Prediabetes  - Hemoglobin A1C; Future  - Insulin; Future    2. Low ferritin level  - CBC With Differential With Platelet; Future  - Iron And Tibc; Future  - Ferritin; Future    3. Other fatigue    4. Abdominal bloating    5. Low vitamin D level  - Vitamin D; Future    6. Endocrine axis dysfunction  - Pregnenolone by MS/MS, Serum; Future  - Dehydroepiandrosterone Sulfate; Future      This visit was conducted using Telemedicine with live, interactive video and audio.   The patient understands the risks and benefits of Telemedicine and that a Telemedicine visit limits the ability to perform a thorough physical examination which may affect objective findings related to specific symptoms and conditions which can, in turn, affect treatment.     The patient was located in the [facility] at the time of the encounter.      Time spent with patient: Over 40 minutes spent in chart review and in direct communication with patient obtaining and reviewing history, creating a unique care plan, explaining the rationale for treatment, reviewing potential SE and overall treatment plan,  documenting all clinical information in Epic. Over 50% of this time was in education, counseling and coordination of care.     Problem List Items Addressed This Visit    None  Visit Diagnoses       Prediabetes    -  Primary    Relevant Orders    Hemoglobin A1C    Insulin    Low ferritin level        Relevant Orders    CBC With Differential With Platelet    Iron And Tibc    Ferritin    Other fatigue        Abdominal bloating        Low vitamin D level        Relevant Orders    Vitamin D    Endocrine axis dysfunction        Relevant Orders    Pregnenolone by MS/MS, Serum    Dehydroepiandrosterone Sulfate            Low ferritin -improved after taking 2 caps of hemogenics daily, but is still low.  -> Plan to add reacted iron once daily    GI -bloating and gas typically just with certain foods.  celiac markers were negative.  H. pylori order was placed, but was not tested ( her daughter tested positive) but symptoms have improved.    -> Increase fiber and continue probiotic    Endocrine:  Prediabetes and weight gain, elevated leptin markers and high levels of hunger suggest leptin resistance.  Hemoglobin A1c at 5.7.    -> Start leptin manager   -> Recommend metabolic Xtra  -> Consider starting HIIT workouts due to multiple benefits of reducing stress, increasing metabolism, increasing lean muscle mass    High stress-> to check adrenal hormones next blood draw as this could be contributing to her weight loss resistance    Follow-up in 3 months after next set of blood work.  Given further recommendations as below    Orders Placed This Visit:  Orders Placed This Encounter   Procedures    Hemoglobin A1C    Insulin    Vitamin D    CBC With Differential With Platelet    Iron And Tibc    Ferritin    Pregnenolone by MS/MS, Serum    Dehydroepiandrosterone Sulfate     No orders of the defined types were placed in this encounter.      Patient Instructions   The Scientific 7 minute workout:  In 12 exercises deploying only body weight, a chair and a wall, it fulfills the latest mandates for high-intensity effort, which essentially combines a long run and a visit to the weight room into about seven minutes of steady discomfort  Work by scientists at [facility] in [facility], and other institutions shows, for instance, that even a few minutes of training at an intensity approaching your maximum capacity produces molecular changes within muscles comparable to those of several hours of cardio (I.e. running or biking).  Example video to follow along with here:  [email or web address].com/[email or web address]?[email or web address]     2. Metabolic Xtra by Pure Encapsulations    Metabolic Xtra is a powerful combination of nutrients that helps support insulin receptor function as well as healthy glucose metabolism. It contains alpha  lipoid acid, chromium, berberine, and resveratrol.    Directions - 1 tab three times per day before meals.  May cause heart burn if taken without food       3.     Tomorrow’s Nutrition Sunfiber® delivers 6 grams of clinically proven, clear, grit free soluble fiber. A true regulating fiber, improves both conditions of occasional constipation and poor elimination as well as occasional diarrhea and loose stools. The result is regularity with healthy consistency    Dose: Take 1 scoop daily before meals    4. Hemagenics twice daily and add:   Reacted Iron by 9+    Reacted Iron includes high-concentration, gentle iron to support energy production and a variety of other protocols.  Directions: 1 capsule per day      No follow-ups on file.    Patient affirmed understanding of plan and all questions were answered.     Nanda Casey PA-C

## 2024-02-21 NOTE — PATIENT INSTRUCTIONS
The Scientific 7 minute workout:  In 12 exercises deploying only body weight, a chair and a wall, it fulfills the latest mandates for high-intensity effort, which essentially combines a long run and a visit to the weight room into about seven minutes of steady discomfort  Work by scientists at [facility] in [facility], and other institutions shows, for instance, that even a few minutes of training at an intensity approaching your maximum capacity produces molecular changes within muscles comparable to those of several hours of cardio (I.e. running or biking).  Example video to follow along with here:  https://www.Qompium.com/[email or web address]?v=[email or web address]     2. Metabolic Xtra by Pure [employer]caps[employer]    Metabolic Xtra is a powerful combination of nutrients that helps support insulin receptor function as well as healthy glucose metabolism. It contains alpha lipoid acid, chromium, berberine, and resveratrol.    Directions - 1 tab three times per day before meals.  May cause heart burn if taken without food       3.     Tomorrow’s Nutrition Sunfiber® delivers 6 grams of clinically proven, clear, grit free soluble fiber. A true regulating fiber, improves both conditions of occasional constipation and poor elimination as well as occasional diarrhea and loose stools. The result is regularity with healthy consistency    Dose: Take 1 scoop daily before meals    4. Hemagenics twice daily and add:   Reacted Iron by [employer]    Reacted Iron includes high-concentration, gentle iron to support energy production and a variety of other protocols.  Directions: 1 capsule per day

## 2024-07-02 ENCOUNTER — NURSE ONLY (OUTPATIENT)
Dept: LAB | Age: 39
End: 2024-07-02
Attending: PHYSICIAN ASSISTANT
Payer: COMMERCIAL

## 2024-07-02 DIAGNOSIS — R79.89 LOW VITAMIN D LEVEL: ICD-10-CM

## 2024-07-02 DIAGNOSIS — R73.03 PREDIABETES: ICD-10-CM

## 2024-07-02 DIAGNOSIS — E34.8 ENDOCRINE AXIS DYSFUNCTION: ICD-10-CM

## 2024-07-02 DIAGNOSIS — R79.0 LOW FERRITIN LEVEL: ICD-10-CM

## 2024-07-02 DIAGNOSIS — R14.0 ABDOMINAL BLOATING: ICD-10-CM

## 2024-07-02 DIAGNOSIS — R14.3 FLATULENCE: ICD-10-CM

## 2024-07-02 LAB
BASOPHILS # BLD AUTO: 0.02 X10(3) UL (ref 0–0.2)
BASOPHILS NFR BLD AUTO: 0.3 %
DEPRECATED HBV CORE AB SER IA-ACNC: 31.9 NG/ML
EOSINOPHIL # BLD AUTO: 0.12 X10(3) UL (ref 0–0.7)
EOSINOPHIL NFR BLD AUTO: 1.8 %
ERYTHROCYTE [DISTWIDTH] IN BLOOD BY AUTOMATED COUNT: 12.6 %
HCT VFR BLD AUTO: 42.4 %
HGB BLD-MCNC: 14.2 G/DL
IMM GRANULOCYTES # BLD AUTO: 0.01 X10(3) UL (ref 0–1)
IMM GRANULOCYTES NFR BLD: 0.1 %
INSULIN SERPL-ACNC: 25.9 MU/L (ref 3–25)
IRON SATN MFR SERPL: 19 %
IRON SERPL-MCNC: 78 UG/DL
LYMPHOCYTES # BLD AUTO: 1.85 X10(3) UL (ref 1–4)
LYMPHOCYTES NFR BLD AUTO: 27 %
MCH RBC QN AUTO: 30.4 PG (ref 26–34)
MCHC RBC AUTO-ENTMCNC: 33.5 G/DL (ref 31–37)
MCV RBC AUTO: 90.8 FL
MONOCYTES # BLD AUTO: 0.46 X10(3) UL (ref 0.1–1)
MONOCYTES NFR BLD AUTO: 6.7 %
NEUTROPHILS # BLD AUTO: 4.39 X10 (3) UL (ref 1.5–7.7)
NEUTROPHILS # BLD AUTO: 4.39 X10(3) UL (ref 1.5–7.7)
NEUTROPHILS NFR BLD AUTO: 64.1 %
PLATELET # BLD AUTO: 227 10(3)UL (ref 150–450)
RBC # BLD AUTO: 4.67 X10(6)UL
TIBC SERPL-MCNC: 405 UG/DL (ref 240–450)
TRANSFERRIN SERPL-MCNC: 272 MG/DL (ref 200–360)
VIT D+METAB SERPL-MCNC: 45.8 NG/ML (ref 30–100)
WBC # BLD AUTO: 6.9 X10(3) UL (ref 4–11)

## 2024-07-02 PROCEDURE — 83013 H PYLORI (C-13) BREATH: CPT

## 2024-07-02 PROCEDURE — 83540 ASSAY OF IRON: CPT

## 2024-07-02 PROCEDURE — 82728 ASSAY OF FERRITIN: CPT

## 2024-07-02 PROCEDURE — 82627 DEHYDROEPIANDROSTERONE: CPT

## 2024-07-02 PROCEDURE — 84140 ASSAY OF PREGNENOLONE: CPT

## 2024-07-02 PROCEDURE — 83550 IRON BINDING TEST: CPT

## 2024-07-02 PROCEDURE — 83525 ASSAY OF INSULIN: CPT

## 2024-07-02 PROCEDURE — 85025 COMPLETE CBC W/AUTO DIFF WBC: CPT

## 2024-07-02 PROCEDURE — 82306 VITAMIN D 25 HYDROXY: CPT

## 2024-07-02 PROCEDURE — 83036 HEMOGLOBIN GLYCOSYLATED A1C: CPT

## 2024-07-03 LAB
DHEA-S SERPL-MCNC: 225.2 UG/DL
EST. AVERAGE GLUCOSE BLD GHB EST-MCNC: 108 MG/DL (ref 68–126)
HBA1C MFR BLD: 5.4 % (ref ?–5.7)

## 2024-07-05 LAB — H PYLORI BREATH TEST: NEGATIVE

## 2024-07-10 ENCOUNTER — TELEMEDICINE (OUTPATIENT)
Dept: INTEGRATIVE MEDICINE | Facility: CLINIC | Age: 39
End: 2024-07-10
Payer: COMMERCIAL

## 2024-07-10 DIAGNOSIS — F41.9 ANXIETY: ICD-10-CM

## 2024-07-10 DIAGNOSIS — E34.8 ENDOCRINE AXIS DYSFUNCTION: ICD-10-CM

## 2024-07-10 DIAGNOSIS — R73.03 PREDIABETES: Primary | ICD-10-CM

## 2024-07-10 DIAGNOSIS — R79.89 LOW VITAMIN D LEVEL: ICD-10-CM

## 2024-07-10 DIAGNOSIS — R63.5 WEIGHT GAIN: ICD-10-CM

## 2024-07-10 DIAGNOSIS — R53.83 OTHER FATIGUE: ICD-10-CM

## 2024-07-10 DIAGNOSIS — R14.0 ABDOMINAL BLOATING: ICD-10-CM

## 2024-07-10 DIAGNOSIS — N94.3 PMS (PREMENSTRUAL SYNDROME): ICD-10-CM

## 2024-07-10 DIAGNOSIS — R79.0 LOW FERRITIN LEVEL: ICD-10-CM

## 2024-07-10 PROCEDURE — 99214 OFFICE O/P EST MOD 30 MIN: CPT | Performed by: PHYSICIAN ASSISTANT

## 2024-07-10 NOTE — PROGRESS NOTES
Gela Bro is a 38 year old female.  Chief Complaint   Patient presents with    Follow - Up     Lab results       HPI:   37yo female presents today via video for f/u on labs.  7/2/24 Labs to be reviewed    HgA1c improved!   Has been eating more low carb and less processed foods  Taking the Metabolic Xtra 1-2x/day  Trying to move more - will do 10-15minute videos - gives her more energy as well.    Low iron, but improved - will miss the third dose of iron frequently    Energy slightly better    2weeks before menses mood is anxious and irritable.   Menses regular - typically every 30days    Long h/o GI issues  Gut improved with eating better, still getting some bloating from time to time  Has not been able to try elimination diet    --------------------------------------------Last OV 2/21/24--------------------------------------    Last labs 1/5/24 - low ferritin and Vit D.   Taking 5000 IU's and vitamin D since January.    Low ferritin - she feels it could be secondary to digestive issues. Did not yet increase Hemagenics to TID after low results in Jan labs - was afraid of taking extra due to B12 and Vit C and cu.   Menses heavy first couple days.     Had ultrasound (2/4/24) due to significant fam h/o ovarian and breast cancer - was normal.    Fatigue in afternoon and mood dips.     Struggles with weight loss.   Did not take the leptin manager yet.   Joined gym and going to workout classes: spin and strength training.     Does a lot of sitting at computer for work.     GI - Has always had GI issues since childhood.   Does have a lot of bloating and gas, but depends on what she eats.  Good BM daily.   Has tried to remove gluten, but not completely.   Takes Therbiotic by [facility].     (Daughter has a gluten sensitivity, ADHD, and tested positive for H. pylori.)  Celiac testing in January labs was negative.  Denies abdominal pain, melena or hematochezia.   No colonoscopies to date.  Family history of  gastric cancer, no colon cancer.    See Below for pertinent lifestyle factors.      REVIEW OF SYSTEMS:   Review of Systems   Constitutional:  Positive for fatigue. Negative for chills and fever.   Eyes: Negative.  Negative for visual disturbance.   Respiratory: Negative.  Negative for cough, shortness of breath and wheezing.    Cardiovascular: Negative.  Negative for chest pain.   Gastrointestinal:  Negative for abdominal pain, blood in stool, constipation, diarrhea, nausea and vomiting.        Bloating and gas   Endocrine: Negative.    Genitourinary: Negative.    Musculoskeletal: Negative.    Skin: Negative.    Allergic/Immunologic: Negative.    Neurological: Negative.  Negative for weakness and headaches.   Hematological: Negative.    Psychiatric/Behavioral: Negative.              FAMILY HISTORY:      Family History   Problem Relation Age of Onset    Cancer Father     Hypertension Father     Hypertension Mother     Cancer Maternal Grandmother     Cancer Maternal Grandfather     Cancer Paternal Grandmother     Heart Disorder Paternal Grandfather     Hypertension Brother      Breast and ovarian ca - 2 aunts  at 51yo  MGM stomach cancer    Father, stage 4 prostate cancer  Mother, healthy  MEDICAL HISTORY:   History reviewed. No pertinent past medical history.    CURRENT MEDICATIONS:     Current Outpatient Medications   Medication Sig Dispense Refill    ferrous sulfate 325 (65 FE) MG Oral Tab EC Take 1 tablet (325 mg total) by mouth daily with breakfast.         SOCIAL HISTORY:   Lifestyle Factors affecting health:  Diet - cooks at home, whole foods, limits processed foods. Before menses gets increased cravings. Tolerates dairy, but does have very little - greek yogurt and some cheese.    Exercise - moving a lot during the day, see above    Stress - high, exercise helps to reduce    Sleep - good, falls asleep easily by 10pm. Will sometimes wake due to the kids.       Son in , daughter  8yo    Social History     Socioeconomic History    Marital status:    Tobacco Use    Smoking status: Never    Smokeless tobacco: Never   Vaping Use    Vaping status: Never Used   Substance and Sexual Activity    Alcohol use: Never    Drug use: Never   Other Topics Concern    Caffeine Concern No    Exercise Yes    Seat Belt No    Special Diet No    Stress Concern Yes    Weight Concern Yes       SURGICAL HISTORY:     Past Surgical History:   Procedure Laterality Date    Tibia fracture surgery Left 2001       PHYSICAL EXAM:     There were no vitals filed for this visit.        Physical Exam  Constitutional:       General: She is not in acute distress.     Appearance: Normal appearance. She is not ill-appearing or toxic-appearing.   HENT:      Head: Normocephalic and atraumatic.   Eyes:      Extraocular Movements: Extraocular movements intact.   Pulmonary:      Effort: Pulmonary effort is normal. No respiratory distress.   Musculoskeletal:      Cervical back: Normal range of motion.   Skin:     Coloration: Skin is not jaundiced.      Findings: No lesion.   Neurological:      Mental Status: She is alert and oriented to person, place, and time.   Psychiatric:         Mood and Affect: Mood normal.         Behavior: Behavior normal.         Thought Content: Thought content normal.         Judgment: Judgment normal.          ASSESSMENT AND PLAN:     Nurse Only on 07/02/2024   Component Date Value Ref Range Status    H pylori Breath Test 07/02/2024 Negative  Negative Final    HgbA1C 07/02/2024 5.4  <5.7 % Final     Normal HbA1C:     <5.7%      Pre-Diabetic:     5.7 - 6.4%      Diabetic:         >6.4%      Diabetic Control: <7.0%        Estimated Average Glucose 07/02/2024 108  68 - 126 mg/dL Final    eAG is the estimated average glucose calculated from Hgb A1c according to the formula recommended by the American Diabetes Association. eAG levels reflect the long term average glucose and may not correlate with random or  fasting glucose levels since these represent specific points in time.           Insulin 07/02/2024 25.9 (H)  3.0 - 25.0 mU/L Final    Iron 07/02/2024 78  50 - 170 ug/dL Final    Transferrin 07/02/2024 272  200 - 360 mg/dL Final    Total Iron Binding Capacity 07/02/2024 405  240 - 450 ug/dL Final    % Saturation 07/02/2024 19  15 - 50 % Final    Ferritin 07/02/2024 31.9  12.0 - 160.0 ng/mL Final    This test may exhibit interference when a sample is collected from a person who is consuming high dose of biotin (a.k.a., vitamin B7, vitamin H, coenzyme R) supplements resulting in serum concentrations >100 ng/mL.  Intake of the recommended daily allowance (RDA) for biotin (0.03 mg) has not been shown to typically cause significant interference; however, high dose daily dietary supplements may contain biotin concentrations greater than 150 times (5-10 mg) the RDA.  It is recommended that physicians ask all patients who may be on biotin supplementation to stop biotin consumption at least 72 hours prior to collection of a new sample.      DHEA Sulfate 07/02/2024 225.2  25.9 - 460.2 ug/dL Final    This test may exhibit interference of greater than 10% when a sample is collected from a person who is consuming high dose of biotin (a.k.a., vitamin B7, vitamin H, coenzyme R) supplements resulting in serum concentrations &gt;12.5 ng/mL, leading to either falsely elevated or falsely depressed results.  Intake of the recommended daily allowance (RDA) for biotin (0.03 mg) has not been shown to typically cause significant interference; however, high dose daily dietary supplements may contain biotin concentrations greater than 150 times (5-10 mg) the RDA.  It is recommended that physicians ask all patients who may be on biotin supplementation to stop biotin consumption at least 72 hours prior to collection of a new sample.    Vitamin D, 25OH, Total 07/02/2024 45.8  30.0 - 100.0 ng/mL Final    Literature Recommendations for 25(OH)D  levels are:  Range           Vitamin D Status   <20    ng/mL      Deficiency   20-<30 ng/mL      Insufficiency    ng/mL      Sufficiency   >100   ng/mL      Toxicity    *Clinical controversy exists regarding optimal 25(OH)D levels. Emerging evidence links potential adverse effects to high levels, particularly >60 ng/mL.        WBC 07/02/2024 6.9  4.0 - 11.0 x10(3) uL Final    RBC 07/02/2024 4.67  3.80 - 5.30 x10(6)uL Final    HGB 07/02/2024 14.2  12.0 - 16.0 g/dL Final    HCT 07/02/2024 42.4  35.0 - 48.0 % Final    PLT 07/02/2024 227.0  150.0 - 450.0 10(3)uL Final    MCV 07/02/2024 90.8  80.0 - 100.0 fL Final    MCH 07/02/2024 30.4  26.0 - 34.0 pg Final    MCHC 07/02/2024 33.5  31.0 - 37.0 g/dL Final    RDW 07/02/2024 12.6  % Final    Neutrophil Absolute Prelim 07/02/2024 4.39  1.50 - 7.70 x10 (3) uL Final    Neutrophil Absolute 07/02/2024 4.39  1.50 - 7.70 x10(3) uL Final    Lymphocyte Absolute 07/02/2024 1.85  1.00 - 4.00 x10(3) uL Final    Monocyte Absolute 07/02/2024 0.46  0.10 - 1.00 x10(3) uL Final    Eosinophil Absolute 07/02/2024 0.12  0.00 - 0.70 x10(3) uL Final    Basophil Absolute 07/02/2024 0.02  0.00 - 0.20 x10(3) uL Final    Immature Granulocyte Absolute 07/02/2024 0.01  0.00 - 1.00 x10(3) uL Final    Neutrophil % 07/02/2024 64.1  % Final    Lymphocyte % 07/02/2024 27.0  % Final    Monocyte % 07/02/2024 6.7  % Final    Eosinophil % 07/02/2024 1.8  % Final    Basophil % 07/02/2024 0.3  % Final    Immature Granulocyte % 07/02/2024 0.1  % Final      No results found.    1. Prediabetes  - Insulin; Future  - Hemoglobin A1C; Future    2. Low ferritin level  - Iron And Tibc; Future  - Ferritin; Future  - CBC With Differential With Platelet; Future  - Occult Blood, Fecal, Immunoassay (Blue cards) [E]; Future    3. Other fatigue  - Assay, Thyroid Stim Hormone; Future  - Free T4, (Free Thyroxine); Future  - Free T3 (Triiodothryronine); Future    4. Low vitamin D level    5. Abdominal bloating    6.  Endocrine axis dysfunction  - Estrogens Fractionated, Serum; Future  - Testosterone,Total and Weakly Bound w/ SHBG; Future  - Progesterone; Future  - Assay, Thyroid Stim Hormone; Future  - Free T4, (Free Thyroxine); Future  - Free T3 (Triiodothryronine); Future    7. Anxiety  - Assay, Thyroid Stim Hormone; Future  - Free T4, (Free Thyroxine); Future  - Free T3 (Triiodothryronine); Future    8. PMS (premenstrual syndrome)  - Estrogens Fractionated, Serum; Future  - Testosterone,Total and Weakly Bound w/ SHBG; Future  - Progesterone; Future    9. Weight gain  - Assay, Thyroid Stim Hormone; Future  - Free T4, (Free Thyroxine); Future  - Free T3 (Triiodothryronine); Future        This visit was conducted using Telemedicine with live, interactive video and audio.   The patient understands the risks and benefits of Telemedicine and that a Telemedicine visit limits the ability to perform a thorough physical examination which may affect objective findings related to specific symptoms and conditions which can, in turn, affect treatment.     The patient was located in the [facility] at the time of the encounter.      Time spent with patient: Over 35 minutes spent in chart review and in direct communication with patient obtaining and reviewing history, creating a unique care plan, explaining the rationale for treatment, reviewing potential SE and overall treatment plan,  documenting all clinical information in Epic. Over 50% of this time was in education, counseling and coordination of care.     Problem List Items Addressed This Visit    None  Visit Diagnoses       Prediabetes    -  Primary    Relevant Orders    Insulin    Hemoglobin A1C    Low ferritin level        Relevant Orders    Iron And Tibc    Ferritin    CBC With Differential With Platelet    Occult Blood, Fecal, Immunoassay (Blue cards) [E]    Other fatigue        Relevant Orders    Assay, Thyroid Stim Hormone    Free T4, (Free Thyroxine)    Free T3  (Triiodothryronine)    Low vitamin D level        Abdominal bloating        Endocrine axis dysfunction        Relevant Orders    Estrogens Fractionated, Serum    Testosterone,Total and Weakly Bound w/ SHBG    Progesterone    Assay, Thyroid Stim Hormone    Free T4, (Free Thyroxine)    Free T3 (Triiodothryronine)    Anxiety        Relevant Orders    Assay, Thyroid Stim Hormone    Free T4, (Free Thyroxine)    Free T3 (Triiodothryronine)    PMS (premenstrual syndrome)        Relevant Orders    Estrogens Fractionated, Serum    Testosterone,Total and Weakly Bound w/ SHBG    Progesterone    Weight gain        Relevant Orders    Assay, Thyroid Stim Hormone    Free T4, (Free Thyroxine)    Free T3 (Triiodothryronine)              Low ferritin -improved with taking 2-3 caps of hemogenics or reacted iron daily, but is still low.  Suspect from poor absorption and menorrhagia, but discussed need to r/o occult blood.   -> FIT test ordered  -> Recheck levels in 3 months    GI -bloating and gas typically just with certain foods.   celiac markers were negative.    H. pylori is negative.  -> Recommend gut and liver support as below  ->Atrantil to rebalance microbiome and improve symptoms of bloating    Endocrine:  Prediabetes and weight gain, elevated leptin markers and high levels of hunger suggest leptin resistance.  Hemoglobin A1c improved with dietary changes and metabolic extra.  -> Continue incorporating more HIIT workouts due to multiple benefits of reducing stress, increasing metabolism, increasing lean muscle mass    PMS-suspect patient needs progesterone support due to symptoms and luteal phase.  Recommendation below and recheck hormone levels in 3 months.    High stress-> DHEA level good, still awaiting Pregnenolone result    Follow-up in 3 months after next set of blood work.  Given further recommendations as below    Orders Placed This Visit:  Orders Placed This Encounter   Procedures    Estrogens Fractionated, Serum     Testosterone,Total and Weakly Bound w/ SHBG    Progesterone    Insulin    Hemoglobin A1C    Iron And Tibc    Ferritin    CBC With Differential With Platelet    Assay, Thyroid Stim Hormone    Free T4, (Free Thyroxine)    Free T3 (Triiodothryronine)    Occult Blood, Fecal, Immunoassay (Blue cards) [E]     No orders of the defined types were placed in this encounter.      Patient Instructions   Labs to be drawn between day 20-22 of menstrual cycle in 3 months    2. Inositol + Vitex Plus by [facility]      Chromium … 250 mcg  Inositol … 2,000 mg  Chasteberry Fruit Extract … 400 mg    Alpha Lipoic Acid … 250 mg  Coenzyme Q10 … 100 mg    Suggested Use:  As a dietary supplement, take 2-4 capsules daily     2. Liver-G.I. Detox by Pure Encapsulations    Support for liver and gastrointestinal detoxification  Helps to ensure proper nutrient utilization in the intestine  Take 2 capsules daily, with meals.     3. FIT Stool Test for occult blood - [facility]     4. If bloating and/or gas still persisting, hold the Therbiotic for 1 month and instead take the following to help rebalance the microbiome:  Atrantil PRO     Suggested Use:  Take 2 capsules per day, twice per day  -------------------------------------------------------------------------    Where to Purchase: [email or web address]  This is our [facility] online dispensary for vitamins and supplements where you receive a 10% discount off of supplement prices! Select the \"Log In\" and then use your email address to complete creating your personal account. Please call [employer] at [phone], if you need direct help.     The products and items listed below (the “Products”)  and their claims have not been evaluated by the Food and Drug Administration. Dietary products are not intended to treat, prevent, mitigate or cure disease. Ultimately, you must draw your own conclusion as to the efficacy of the  Product and immediately stop use of the Products if a negative reaction should arise.  You should always consult a licensed health care professional before starting any supplement, dietary, or exercise program, especially if you are pregnant or have any pre-existing injuries or medical conditions. The patient agrees that the [facility] and its affiliates and its [facility] (“[facility]”) are not liable for the patients use of the Products. [facility] makes no representations or warranties of any kind, expressed or implied, as to the Products, including, but not limited to its efficacy, benefits or outcomes.  Patient agrees to contact his/her healthcare professional and stop use of Products should any reactions arise        No follow-ups on file.    Patient affirmed understanding of plan and all questions were answered.     Nanda Casey PA-C

## 2024-07-10 NOTE — PATIENT INSTRUCTIONS
Labs to be drawn between day 20-22 of menstrual cycle in 3 months    2. Inositol + Vitex Plus by [facility]      Chromium … 250 mcg  Inositol … 2,000 mg  Chasteberry Fruit Extract … 400 mg    Alpha Lipoic Acid … 250 mg  Coenzyme Q10 … 100 mg    Suggested Use:  As a dietary supplement, take 2-4 capsules daily     2. Liver-G.I. Detox by Pure Encapsulations    Support for liver and gastrointestinal detoxification  Helps to ensure proper nutrient utilization in the intestine  Take 2 capsules daily, with meals.     3. FIT Stool Test for occult blood - to be picked up at any [facility] lab     4. If bloating and/or gas still persisting, hold the Therbiotic for 1 month and instead take the following to help rebalance the microbiome:  Atrantil PRO     Suggested Use:  Take 2 capsules per day, twice per day  -------------------------------------------------------------------------    Where to Purchase: [email or web address]  This is our [facility] online dispensary for vitamins and supplements where you receive a 10% discount off of supplement prices! Select the \"Log In\" and then use your email address to complete creating your personal account. Please call [employer] at [phone], if you need direct help.     The products and items listed below (the “Products”)  and their claims have not been evaluated by the Food and Drug Administration. Dietary products are not intended to treat, prevent, mitigate or cure disease. Ultimately, you must draw your own conclusion as to the efficacy of the Product and immediately stop use of the Products if a negative reaction should arise.  You should always consult a licensed health care professional before starting any supplement, dietary, or exercise program, especially if you are pregnant or have any pre-existing injuries or medical conditions. The patient agrees that the [facility] and its affiliates and its [facility]  [facility] (“[facility]”) are not liable for the patients use of the Products. [facility] makes no representations or warranties of any kind, expressed or implied, as to the Products, including, but not limited to its efficacy, benefits or outcomes.  Patient agrees to contact his/her healthcare professional and stop use of Products should any reactions arise

## 2024-07-12 LAB — PREGNENOLONE: 63 NG/DL

## 2024-07-20 ENCOUNTER — LAB REQUISITION (OUTPATIENT)
Dept: LAB | Age: 39
End: 2024-07-20

## 2024-07-20 DIAGNOSIS — E66.9 OBESITY, UNSPECIFIED: ICD-10-CM

## 2024-07-20 DIAGNOSIS — Z13.21 ENCOUNTER FOR SCREENING FOR NUTRITIONAL DISORDER: ICD-10-CM

## 2024-07-20 DIAGNOSIS — Z13.29 ENCOUNTER FOR SCREENING FOR OTHER SUSPECTED ENDOCRINE DISORDER: ICD-10-CM

## 2024-07-20 DIAGNOSIS — R53.83 OTHER FATIGUE: ICD-10-CM

## 2024-07-20 LAB
ALBUMIN SERPL-MCNC: 4.2 G/DL (ref 3.6–5.1)
ALBUMIN/GLOB SERPL: 1.2 {RATIO} (ref 1–2.4)
ALP SERPL-CCNC: 82 UNITS/L (ref 45–117)
ALT SERPL-CCNC: 37 UNITS/L
ANION GAP SERPL CALC-SCNC: 12 MMOL/L (ref 7–19)
AST SERPL-CCNC: 24 UNITS/L
BILIRUB SERPL-MCNC: 0.7 MG/DL (ref 0.2–1)
BUN SERPL-MCNC: 12 MG/DL (ref 6–20)
BUN/CREAT SERPL: 16 (ref 7–25)
CALCIUM SERPL-MCNC: 9.3 MG/DL (ref 8.4–10.2)
CHLORIDE SERPL-SCNC: 104 MMOL/L (ref 97–110)
CO2 SERPL-SCNC: 25 MMOL/L (ref 21–32)
CORTIS SERPL-MCNC: 13.2 MCG/DL (ref 3.4–22.5)
CREAT SERPL-MCNC: 0.75 MG/DL (ref 0.51–0.95)
EGFRCR SERPLBLD CKD-EPI 2021: >90 ML/MIN/{1.73_M2}
ERYTHROCYTE [SEDIMENTATION RATE] IN BLOOD BY WESTERGREN METHOD: 21 MM/HR (ref 0–20)
FASTING DURATION TIME PATIENT: NORMAL H
FOLATE SERPL-MCNC: >24 NG/ML
GLOBULIN SER-MCNC: 3.5 G/DL (ref 2–4)
GLUCOSE SERPL-MCNC: 79 MG/DL (ref 70–99)
POTASSIUM SERPL-SCNC: 4.3 MMOL/L (ref 3.4–5.1)
PROGEST SERPL-MCNC: 6.59 NG/ML
PROT SERPL-MCNC: 7.7 G/DL (ref 6.4–8.2)
SODIUM SERPL-SCNC: 137 MMOL/L (ref 135–145)
VIT B12 SERPL-MCNC: 626 PG/ML (ref 211–911)

## 2024-07-20 PROCEDURE — 85652 RBC SED RATE AUTOMATED: CPT | Performed by: CLINICAL MEDICAL LABORATORY

## 2024-07-21 LAB
APPEARANCE UR: CLEAR
BACTERIA #/AREA URNS HPF: ABNORMAL /HPF
BILIRUB UR QL STRIP: NEGATIVE
COLOR UR: COLORLESS
CRP SERPL HS-MCNC: 2.02 MG/L
FASTING DURATION TIME PATIENT: NORMAL H
GLUCOSE UR STRIP-MCNC: NEGATIVE MG/DL
HGB UR QL STRIP: NEGATIVE
HYALINE CASTS #/AREA URNS LPF: ABNORMAL /LPF
INSULIN P FAST SERPL-ACNC: 9 MUNITS/L (ref 3–28)
KETONES UR STRIP-MCNC: NEGATIVE MG/DL
LEUKOCYTE ESTERASE UR QL STRIP: NEGATIVE
MAGNESIUM SERPL-MCNC: 2.2 MG/DL (ref 1.7–2.4)
NITRITE UR QL STRIP: NEGATIVE
PH UR STRIP: 7.5 [PH] (ref 5–7)
PROT UR STRIP-MCNC: NEGATIVE MG/DL
RBC #/AREA URNS HPF: ABNORMAL /HPF
SP GR UR STRIP: 1.01 (ref 1–1.03)
SQUAMOUS #/AREA URNS HPF: ABNORMAL /HPF
T3FREE SERPL-MCNC: 3 PG/ML (ref 2.2–4)
T4 FREE SERPL-MCNC: 1.1 NG/DL (ref 0.8–1.5)
THYROGLOB AB SERPL-ACNC: <0.9 IU/ML (ref 0–4)
THYROPEROXIDASE AB SERPL-ACNC: <28 UNITS/ML
TSH SERPL-ACNC: 2.23 MCUNITS/ML (ref 0.35–5)
UROBILINOGEN UR STRIP-MCNC: 0.2 MG/DL
WBC #/AREA URNS HPF: ABNORMAL /HPF

## 2024-07-22 LAB
COPPER SERPL-MCNC: 106 MCG/DL (ref 80–155)
EBV EA IGG SER-ACNC: <0.2 AI
EBV NA IGG SER-ACNC: >8 AI
EBV VCA IGG SER-ACNC: >8 AI
EBV VCA IGM SER-ACNC: <0.2 AI
ZINC SERPL-MCNC: 71 MCG/DL (ref 70–120)

## 2024-07-26 LAB
SHBG SERPL-SCNC: 42 NMOL/L (ref 25–122)
TESTOST FREE SERPL DL<=1.0 NG/DL-MCNC: 7.2 PG/ML (ref 1.3–9.2)
TESTOST SERPL-MCNC: 50 NG/DL (ref 9–55)
TESTOSTERONE.FREE+WB SERPL-MCNC: 20.5 NG/DL (ref 4.1–25.5)

## 2024-08-03 ENCOUNTER — LAB REQUISITION (OUTPATIENT)
Dept: LAB | Age: 39
End: 2024-08-03

## 2024-08-03 DIAGNOSIS — R53.83 OTHER FATIGUE: ICD-10-CM

## 2024-08-03 DIAGNOSIS — Z13.21 ENCOUNTER FOR SCREENING FOR NUTRITIONAL DISORDER: ICD-10-CM

## 2024-08-03 DIAGNOSIS — E66.9 OBESITY, UNSPECIFIED: ICD-10-CM

## 2024-08-03 DIAGNOSIS — Z13.29 ENCOUNTER FOR SCREENING FOR OTHER SUSPECTED ENDOCRINE DISORDER: ICD-10-CM

## 2024-08-03 PROCEDURE — 82728 ASSAY OF FERRITIN: CPT | Performed by: CLINICAL MEDICAL LABORATORY

## 2024-08-03 PROCEDURE — PSEU8262 FREE T4: Performed by: CLINICAL MEDICAL LABORATORY

## 2024-08-03 PROCEDURE — PSEU8196 FREE T3: Performed by: CLINICAL MEDICAL LABORATORY

## 2024-08-03 PROCEDURE — 83002 ASSAY OF GONADOTROPIN (LH): CPT | Performed by: CLINICAL MEDICAL LABORATORY

## 2024-08-03 PROCEDURE — 86800 THYROGLOBULIN ANTIBODY: CPT | Performed by: CLINICAL MEDICAL LABORATORY

## 2024-08-03 PROCEDURE — PSEU8299 THYROID STIMULATING HORMONE: Performed by: CLINICAL MEDICAL LABORATORY

## 2024-08-03 PROCEDURE — 84480 ASSAY TRIIODOTHYRONINE (T3): CPT | Performed by: CLINICAL MEDICAL LABORATORY

## 2024-08-03 PROCEDURE — 83540 ASSAY OF IRON: CPT | Performed by: CLINICAL MEDICAL LABORATORY

## 2024-08-03 PROCEDURE — 83001 ASSAY OF GONADOTROPIN (FSH): CPT | Performed by: CLINICAL MEDICAL LABORATORY

## 2024-08-03 PROCEDURE — 84443 ASSAY THYROID STIM HORMONE: CPT | Performed by: CLINICAL MEDICAL LABORATORY

## 2024-08-03 PROCEDURE — 84439 ASSAY OF FREE THYROXINE: CPT | Performed by: CLINICAL MEDICAL LABORATORY

## 2024-08-03 PROCEDURE — 83550 IRON BINDING TEST: CPT | Performed by: CLINICAL MEDICAL LABORATORY

## 2024-08-03 PROCEDURE — PSEU8203 IRON AND TOTAL IRON BINDING CAPACITY: Performed by: CLINICAL MEDICAL LABORATORY

## 2024-08-03 PROCEDURE — 86376 MICROSOMAL ANTIBODY EACH: CPT | Performed by: CLINICAL MEDICAL LABORATORY

## 2024-08-03 PROCEDURE — PSEU8088 FOLLICLE STIMULATING & LUTEINIZING HORMONES: Performed by: CLINICAL MEDICAL LABORATORY

## 2024-08-03 PROCEDURE — PSEU8258 ESTRADIOL: Performed by: CLINICAL MEDICAL LABORATORY

## 2024-08-03 PROCEDURE — 82670 ASSAY OF TOTAL ESTRADIOL: CPT | Performed by: CLINICAL MEDICAL LABORATORY

## 2024-08-03 PROCEDURE — PSEU8259 FERRITIN: Performed by: CLINICAL MEDICAL LABORATORY

## 2024-08-03 PROCEDURE — PSEU8223 T3, TOTAL: Performed by: CLINICAL MEDICAL LABORATORY

## 2024-08-03 PROCEDURE — PSEU8556 THYROID ANTIBODIES: Performed by: CLINICAL MEDICAL LABORATORY

## 2024-08-03 PROCEDURE — 84481 FREE ASSAY (FT-3): CPT | Performed by: CLINICAL MEDICAL LABORATORY

## 2024-08-04 LAB
ESTRADIOL SERPL-MCNC: 26 PG/ML
FERRITIN SERPL-MCNC: 27 NG/ML (ref 8–252)
FSH SERPL-ACNC: 7.1 MUNITS/ML
IRON SATN MFR SERPL: 14 % (ref 15–45)
IRON SERPL-MCNC: 48 MCG/DL (ref 50–170)
LH SERPL-ACNC: 4.6 MUNITS/ML
T3 SERPL-MCNC: 0.96 NG/ML (ref 0.6–1.81)
T3FREE SERPL-MCNC: 3 PG/ML (ref 2.2–4)
T4 FREE SERPL-MCNC: 1.2 NG/DL (ref 0.8–1.5)
T4 SERPL-MCNC: 11.7 MCG/DL (ref 4.7–13.3)
TIBC SERPL-MCNC: 349 MCG/DL (ref 250–450)
TSH SERPL-ACNC: 2.24 MCUNITS/ML (ref 0.35–5)

## 2024-08-11 LAB
THYROGLOB AB SERPL-ACNC: <0.9 IU/ML (ref 0–4)
THYROPEROXIDASE AB SERPL-ACNC: 42 UNITS/ML

## 2025-01-12 ENCOUNTER — LAB REQUISITION (OUTPATIENT)
Dept: LAB | Age: 40
End: 2025-01-12

## 2025-01-12 DIAGNOSIS — E66.9 OBESITY, UNSPECIFIED: ICD-10-CM

## 2025-01-12 DIAGNOSIS — R53.83 OTHER FATIGUE: ICD-10-CM

## 2025-01-12 DIAGNOSIS — E61.1 IRON DEFICIENCY: ICD-10-CM

## 2025-01-12 LAB
APPEARANCE UR: ABNORMAL
BASOPHILS # BLD: 0 K/MCL (ref 0–0.3)
BASOPHILS NFR BLD: 0 %
BILIRUB UR QL STRIP: NEGATIVE
COLOR UR: ABNORMAL
CORTIS SERPL-MCNC: 10.7 MCG/DL (ref 3.4–22.5)
CRP SERPL HS-MCNC: 2.61 MG/L
DEPRECATED RDW RBC: 42.6 FL (ref 39–50)
EOSINOPHIL # BLD: 0.1 K/MCL (ref 0–0.5)
EOSINOPHIL NFR BLD: 2 %
ERYTHROCYTE [DISTWIDTH] IN BLOOD: 13 % (ref 11–15)
ERYTHROCYTE [SEDIMENTATION RATE] IN BLOOD BY WESTERGREN METHOD: 24 MM/HR (ref 0–20)
ESTRADIOL SERPL-MCNC: 110 PG/ML
FERRITIN SERPL-MCNC: 21 NG/ML (ref 8–252)
GLUCOSE UR STRIP-MCNC: NEGATIVE MG/DL
HBA1C MFR BLD: 5.4 % (ref 4.5–5.6)
HCT VFR BLD CALC: 42.5 % (ref 36–46.5)
HGB BLD-MCNC: 13.6 G/DL (ref 12–15.5)
HGB UR QL STRIP: NEGATIVE
IMM GRANULOCYTES # BLD AUTO: 0 K/MCL (ref 0–0.2)
IMM GRANULOCYTES # BLD: 0 %
IRON SATN MFR SERPL: 22 % (ref 15–45)
IRON SERPL-MCNC: 85 MCG/DL (ref 50–170)
KETONES UR STRIP-MCNC: NEGATIVE MG/DL
LEUKOCYTE ESTERASE UR QL STRIP: NEGATIVE
LYMPHOCYTES # BLD: 1.7 K/MCL (ref 1–4.8)
LYMPHOCYTES NFR BLD: 26 %
MCH RBC QN AUTO: 28.8 PG (ref 26–34)
MCHC RBC AUTO-ENTMCNC: 32 G/DL (ref 32–36.5)
MCV RBC AUTO: 89.9 FL (ref 78–100)
MONOCYTES # BLD: 0.5 K/MCL (ref 0.3–0.9)
MONOCYTES NFR BLD: 7 %
NEUTROPHILS # BLD: 4.1 K/MCL (ref 1.8–7.7)
NEUTROPHILS NFR BLD: 65 %
NITRITE UR QL STRIP: NEGATIVE
NRBC BLD MANUAL-RTO: 0 /100 WBC
PH UR STRIP: 8 [PH] (ref 5–7)
PLATELET # BLD AUTO: 153 K/MCL (ref 140–450)
PROT UR STRIP-MCNC: ABNORMAL MG/DL
RBC # BLD: 4.73 MIL/MCL (ref 4–5.2)
SP GR UR STRIP: 1.02 (ref 1–1.03)
TIBC SERPL-MCNC: 393 MCG/DL (ref 250–450)
UROBILINOGEN UR STRIP-MCNC: 0.2 MG/DL
WBC # BLD: 6.5 K/MCL (ref 4.2–11)

## 2025-01-12 PROCEDURE — PSEU8506 C REACTIVE PROTEIN HIGH SENSITIVITY: Performed by: CLINICAL MEDICAL LABORATORY

## 2025-01-12 PROCEDURE — 85025 COMPLETE CBC W/AUTO DIFF WBC: CPT | Performed by: CLINICAL MEDICAL LABORATORY

## 2025-01-12 PROCEDURE — 84630 ASSAY OF ZINC: CPT | Performed by: CLINICAL MEDICAL LABORATORY

## 2025-01-12 PROCEDURE — PSEU8258 ESTRADIOL: Performed by: CLINICAL MEDICAL LABORATORY

## 2025-01-12 PROCEDURE — 82627 DEHYDROEPIANDROSTERONE: CPT | Performed by: CLINICAL MEDICAL LABORATORY

## 2025-01-12 PROCEDURE — 82670 ASSAY OF TOTAL ESTRADIOL: CPT | Performed by: CLINICAL MEDICAL LABORATORY

## 2025-01-12 PROCEDURE — 82306 VITAMIN D 25 HYDROXY: CPT | Performed by: CLINICAL MEDICAL LABORATORY

## 2025-01-12 PROCEDURE — 84403 ASSAY OF TOTAL TESTOSTERONE: CPT | Performed by: CLINICAL MEDICAL LABORATORY

## 2025-01-12 PROCEDURE — 84270 ASSAY OF SEX HORMONE GLOBUL: CPT | Performed by: CLINICAL MEDICAL LABORATORY

## 2025-01-12 PROCEDURE — 81003 URINALYSIS AUTO W/O SCOPE: CPT | Performed by: CLINICAL MEDICAL LABORATORY

## 2025-01-12 PROCEDURE — 83036 HEMOGLOBIN GLYCOSYLATED A1C: CPT | Performed by: CLINICAL MEDICAL LABORATORY

## 2025-01-12 PROCEDURE — 82533 TOTAL CORTISOL: CPT | Performed by: CLINICAL MEDICAL LABORATORY

## 2025-01-12 PROCEDURE — PSEU8259 FERRITIN: Performed by: CLINICAL MEDICAL LABORATORY

## 2025-01-12 PROCEDURE — PSEU8203 IRON AND TOTAL IRON BINDING CAPACITY: Performed by: CLINICAL MEDICAL LABORATORY

## 2025-01-12 PROCEDURE — 83525 ASSAY OF INSULIN: CPT | Performed by: CLINICAL MEDICAL LABORATORY

## 2025-01-12 PROCEDURE — 86141 C-REACTIVE PROTEIN HS: CPT | Performed by: CLINICAL MEDICAL LABORATORY

## 2025-01-12 PROCEDURE — PSEU8286 PROGESTERONE: Performed by: CLINICAL MEDICAL LABORATORY

## 2025-01-12 PROCEDURE — PSEU9835 TESTOSTERONE, BIOAVAILABLE, SEX BINDING GLOBULIN, AND TOTAL, FEMALE OR CHILD: Performed by: CLINICAL MEDICAL LABORATORY

## 2025-01-12 PROCEDURE — 83540 ASSAY OF IRON: CPT | Performed by: CLINICAL MEDICAL LABORATORY

## 2025-01-12 PROCEDURE — PSEU8266 GLYCOHEMOGLOBIN: Performed by: CLINICAL MEDICAL LABORATORY

## 2025-01-12 PROCEDURE — PSEU8904 ZINC: Performed by: CLINICAL MEDICAL LABORATORY

## 2025-01-12 PROCEDURE — 82728 ASSAY OF FERRITIN: CPT | Performed by: CLINICAL MEDICAL LABORATORY

## 2025-01-12 PROCEDURE — PSEU8191 DHEA SULFATE: Performed by: CLINICAL MEDICAL LABORATORY

## 2025-01-12 PROCEDURE — 84402 ASSAY OF FREE TESTOSTERONE: CPT | Performed by: CLINICAL MEDICAL LABORATORY

## 2025-01-12 PROCEDURE — 83550 IRON BINDING TEST: CPT | Performed by: CLINICAL MEDICAL LABORATORY

## 2025-01-12 PROCEDURE — PSEU8115 INSULIN LEVEL, FASTING: Performed by: CLINICAL MEDICAL LABORATORY

## 2025-01-12 PROCEDURE — PSEU8229 VITAMIN D -25 HYDROXY: Performed by: CLINICAL MEDICAL LABORATORY

## 2025-01-12 PROCEDURE — 84144 ASSAY OF PROGESTERONE: CPT | Performed by: CLINICAL MEDICAL LABORATORY

## 2025-01-12 PROCEDURE — 85652 RBC SED RATE AUTOMATED: CPT | Performed by: CLINICAL MEDICAL LABORATORY

## 2025-01-12 PROCEDURE — PSEU8187 CORTISOL: Performed by: CLINICAL MEDICAL LABORATORY

## 2025-01-12 PROCEDURE — 86060 ANTISTREPTOLYSIN O TITER: CPT | Performed by: CLINICAL MEDICAL LABORATORY

## 2025-01-13 LAB
25(OH)D3+25(OH)D2 SERPL-MCNC: 59.4 NG/ML (ref 30–100)
ASO AB SERPL-ACNC: 949 IUNITS/ML
DHEA-S SERPL-MCNC: 190.9 MCG/DL (ref 8–391)
FASTING DURATION TIME PATIENT: NORMAL H
INSULIN P FAST SERPL-ACNC: 17 MUNITS/L (ref 3–28)
PROGEST SERPL-MCNC: 0.62 NG/ML
ZINC SERPL-MCNC: 69 MCG/DL (ref 70–120)

## 2025-01-16 LAB
SHBG SERPL-SCNC: 38 NMOL/L (ref 25–122)
TESTOST FREE SERPL DL<=1.0 NG/DL-MCNC: 5.1 PG/ML (ref 1.3–9.2)
TESTOST SERPL-MCNC: 33 NG/DL (ref 9–55)
TESTOSTERONE.FREE+WB SERPL-MCNC: 13.9 NG/DL (ref 4.1–25.5)

## 2025-04-19 ENCOUNTER — LAB REQUISITION (OUTPATIENT)
Dept: LAB | Age: 40
End: 2025-04-19

## 2025-04-19 DIAGNOSIS — E60 DIETARY ZINC DEFICIENCY: ICD-10-CM

## 2025-04-19 DIAGNOSIS — E66.9 OBESITY, UNSPECIFIED: ICD-10-CM

## 2025-04-19 DIAGNOSIS — R53.83 OTHER FATIGUE: ICD-10-CM

## 2025-04-19 DIAGNOSIS — R70.0 ELEVATED ERYTHROCYTE SEDIMENTATION RATE: ICD-10-CM

## 2025-04-19 PROCEDURE — 84439 ASSAY OF FREE THYROXINE: CPT | Performed by: CLINICAL MEDICAL LABORATORY

## 2025-04-19 PROCEDURE — 83550 IRON BINDING TEST: CPT | Performed by: CLINICAL MEDICAL LABORATORY

## 2025-04-19 PROCEDURE — 85652 RBC SED RATE AUTOMATED: CPT | Performed by: CLINICAL MEDICAL LABORATORY

## 2025-04-19 PROCEDURE — PSEU8196 FREE T3: Performed by: CLINICAL MEDICAL LABORATORY

## 2025-04-19 PROCEDURE — 84402 ASSAY OF FREE TESTOSTERONE: CPT | Performed by: CLINICAL MEDICAL LABORATORY

## 2025-04-19 PROCEDURE — PSEU8274 MAGNESIUM: Performed by: CLINICAL MEDICAL LABORATORY

## 2025-04-19 PROCEDURE — PSEU8904 ZINC: Performed by: CLINICAL MEDICAL LABORATORY

## 2025-04-19 PROCEDURE — 83001 ASSAY OF GONADOTROPIN (FSH): CPT | Performed by: CLINICAL MEDICAL LABORATORY

## 2025-04-19 PROCEDURE — PSEU8259 FERRITIN: Performed by: CLINICAL MEDICAL LABORATORY

## 2025-04-19 PROCEDURE — PSEU8191 DHEA SULFATE: Performed by: CLINICAL MEDICAL LABORATORY

## 2025-04-19 PROCEDURE — 83525 ASSAY OF INSULIN: CPT | Performed by: CLINICAL MEDICAL LABORATORY

## 2025-04-19 PROCEDURE — PSEU8115 INSULIN LEVEL, FASTING: Performed by: CLINICAL MEDICAL LABORATORY

## 2025-04-19 PROCEDURE — PSEU8258 ESTRADIOL: Performed by: CLINICAL MEDICAL LABORATORY

## 2025-04-19 PROCEDURE — 86060 ANTISTREPTOLYSIN O TITER: CPT | Performed by: CLINICAL MEDICAL LABORATORY

## 2025-04-19 PROCEDURE — 85025 COMPLETE CBC W/AUTO DIFF WBC: CPT | Performed by: CLINICAL MEDICAL LABORATORY

## 2025-04-19 PROCEDURE — PSEU8286 PROGESTERONE: Performed by: CLINICAL MEDICAL LABORATORY

## 2025-04-19 PROCEDURE — 84630 ASSAY OF ZINC: CPT | Performed by: CLINICAL MEDICAL LABORATORY

## 2025-04-19 PROCEDURE — PSEU8266 GLYCOHEMOGLOBIN: Performed by: CLINICAL MEDICAL LABORATORY

## 2025-04-19 PROCEDURE — 83540 ASSAY OF IRON: CPT | Performed by: CLINICAL MEDICAL LABORATORY

## 2025-04-19 PROCEDURE — PSEU8250 COMPREHENSIVE METABOLIC PANEL: Performed by: CLINICAL MEDICAL LABORATORY

## 2025-04-19 PROCEDURE — 82728 ASSAY OF FERRITIN: CPT | Performed by: CLINICAL MEDICAL LABORATORY

## 2025-04-19 PROCEDURE — PSEU8203 IRON AND TOTAL IRON BINDING CAPACITY: Performed by: CLINICAL MEDICAL LABORATORY

## 2025-04-19 PROCEDURE — 83002 ASSAY OF GONADOTROPIN (LH): CPT | Performed by: CLINICAL MEDICAL LABORATORY

## 2025-04-19 PROCEDURE — 83036 HEMOGLOBIN GLYCOSYLATED A1C: CPT | Performed by: CLINICAL MEDICAL LABORATORY

## 2025-04-19 PROCEDURE — PSEU8270 LIPID PANEL WITHOUT REFLEX: Performed by: CLINICAL MEDICAL LABORATORY

## 2025-04-19 PROCEDURE — PSEU8299 THYROID STIMULATING HORMONE: Performed by: CLINICAL MEDICAL LABORATORY

## 2025-04-19 PROCEDURE — 84481 FREE ASSAY (FT-3): CPT | Performed by: CLINICAL MEDICAL LABORATORY

## 2025-04-19 PROCEDURE — 86141 C-REACTIVE PROTEIN HS: CPT | Performed by: CLINICAL MEDICAL LABORATORY

## 2025-04-19 PROCEDURE — 84144 ASSAY OF PROGESTERONE: CPT | Performed by: CLINICAL MEDICAL LABORATORY

## 2025-04-19 PROCEDURE — PSEU8088 FOLLICLE STIMULATING & LUTEINIZING HORMONES: Performed by: CLINICAL MEDICAL LABORATORY

## 2025-04-19 PROCEDURE — 83735 ASSAY OF MAGNESIUM: CPT | Performed by: CLINICAL MEDICAL LABORATORY

## 2025-04-19 PROCEDURE — 80053 COMPREHEN METABOLIC PANEL: CPT | Performed by: CLINICAL MEDICAL LABORATORY

## 2025-04-19 PROCEDURE — PSEU8187 CORTISOL: Performed by: CLINICAL MEDICAL LABORATORY

## 2025-04-19 PROCEDURE — PSEU8506 C REACTIVE PROTEIN HIGH SENSITIVITY: Performed by: CLINICAL MEDICAL LABORATORY

## 2025-04-19 PROCEDURE — 82533 TOTAL CORTISOL: CPT | Performed by: CLINICAL MEDICAL LABORATORY

## 2025-04-19 PROCEDURE — 84403 ASSAY OF TOTAL TESTOSTERONE: CPT | Performed by: CLINICAL MEDICAL LABORATORY

## 2025-04-19 PROCEDURE — PSEU9835 TESTOSTERONE, BIOAVAILABLE, SEX BINDING GLOBULIN, AND TOTAL, FEMALE OR CHILD: Performed by: CLINICAL MEDICAL LABORATORY

## 2025-04-19 PROCEDURE — 82627 DEHYDROEPIANDROSTERONE: CPT | Performed by: CLINICAL MEDICAL LABORATORY

## 2025-04-19 PROCEDURE — 84443 ASSAY THYROID STIM HORMONE: CPT | Performed by: CLINICAL MEDICAL LABORATORY

## 2025-04-19 PROCEDURE — 80061 LIPID PANEL: CPT | Performed by: CLINICAL MEDICAL LABORATORY

## 2025-04-19 PROCEDURE — 82670 ASSAY OF TOTAL ESTRADIOL: CPT | Performed by: CLINICAL MEDICAL LABORATORY

## 2025-04-19 PROCEDURE — 84270 ASSAY OF SEX HORMONE GLOBUL: CPT | Performed by: CLINICAL MEDICAL LABORATORY

## 2025-04-19 PROCEDURE — PSEU8262 FREE T4: Performed by: CLINICAL MEDICAL LABORATORY

## 2025-04-20 LAB
ALBUMIN SERPL-MCNC: 4 G/DL (ref 3.4–5)
ALBUMIN/GLOB SERPL: 1.2 {RATIO} (ref 1–2.4)
ALP SERPL-CCNC: 89 UNITS/L (ref 45–117)
ALT SERPL-CCNC: 23 UNITS/L
ANION GAP SERPL CALC-SCNC: 12 MMOL/L (ref 7–19)
ASO AB SERPL-ACNC: 836 IUNITS/ML
AST SERPL-CCNC: 18 UNITS/L
BASOPHILS # BLD: 0 K/MCL (ref 0–0.3)
BASOPHILS NFR BLD: 0 %
BILIRUB SERPL-MCNC: 0.8 MG/DL (ref 0.2–1)
BUN SERPL-MCNC: 10 MG/DL (ref 6–20)
BUN/CREAT SERPL: 16 (ref 7–25)
CALCIUM SERPL-MCNC: 9 MG/DL (ref 8.4–10.2)
CHLORIDE SERPL-SCNC: 108 MMOL/L (ref 97–110)
CHOLEST SERPL-MCNC: 170 MG/DL
CHOLEST/HDLC SERPL: 3.3 {RATIO}
CO2 SERPL-SCNC: 22 MMOL/L (ref 21–32)
CORTIS SERPL-MCNC: 15.9 MCG/DL (ref 3.4–22.5)
CREAT SERPL-MCNC: 0.64 MG/DL (ref 0.51–0.95)
CRP SERPL HS-MCNC: 2.69 MG/L
DEPRECATED RDW RBC: 43.4 FL (ref 39–50)
EGFRCR SERPLBLD CKD-EPI 2021: >90 ML/MIN/{1.73_M2}
EOSINOPHIL # BLD: 0.1 K/MCL (ref 0–0.5)
EOSINOPHIL NFR BLD: 2 %
ERYTHROCYTE [DISTWIDTH] IN BLOOD: 13.4 % (ref 11–15)
ERYTHROCYTE [SEDIMENTATION RATE] IN BLOOD BY WESTERGREN METHOD: 18 MM/HR (ref 0–20)
ESTRADIOL SERPL-MCNC: 90 PG/ML
FASTING DURATION TIME PATIENT: NORMAL H
FASTING DURATION TIME PATIENT: NORMAL H
FERRITIN SERPL-MCNC: 13 NG/ML (ref 8–252)
FSH SERPL-ACNC: 4.2 MUNITS/ML
GLOBULIN SER-MCNC: 3.4 G/DL (ref 2–4)
GLUCOSE SERPL-MCNC: 84 MG/DL (ref 70–99)
HBA1C MFR BLD: 5.3 % (ref 4.5–5.6)
HCT VFR BLD CALC: 44 % (ref 36–46.5)
HDLC SERPL-MCNC: 51 MG/DL
HGB BLD-MCNC: 14.3 G/DL (ref 12–15.5)
IMM GRANULOCYTES # BLD AUTO: 0 K/MCL (ref 0–0.2)
IMM GRANULOCYTES # BLD: 0 %
INSULIN P FAST SERPL-ACNC: 9 MUNITS/L (ref 3–28)
IRON SATN MFR SERPL: 30 % (ref 15–45)
IRON SERPL-MCNC: 122 MCG/DL (ref 50–170)
LDLC SERPL CALC-MCNC: 105 MG/DL
LH SERPL-ACNC: 5.2 MUNITS/ML
LYMPHOCYTES # BLD: 1.5 K/MCL (ref 1–4.8)
LYMPHOCYTES NFR BLD: 22 %
MAGNESIUM SERPL-MCNC: 2.2 MG/DL (ref 1.7–2.4)
MCH RBC QN AUTO: 28.6 PG (ref 26–34)
MCHC RBC AUTO-ENTMCNC: 32.5 G/DL (ref 32–36.5)
MCV RBC AUTO: 88 FL (ref 78–100)
MONOCYTES # BLD: 0.4 K/MCL (ref 0.3–0.9)
MONOCYTES NFR BLD: 6 %
NEUTROPHILS # BLD: 4.6 K/MCL (ref 1.8–7.7)
NEUTROPHILS NFR BLD: 70 %
NONHDLC SERPL-MCNC: 119 MG/DL
NRBC BLD MANUAL-RTO: 0 /100 WBC
PLATELET # BLD AUTO: 238 K/MCL (ref 140–450)
POTASSIUM SERPL-SCNC: 4.2 MMOL/L (ref 3.4–5.1)
PROGEST SERPL-MCNC: 6.91 NG/ML
PROT SERPL-MCNC: 7.4 G/DL (ref 6.4–8.2)
RBC # BLD: 5 MIL/MCL (ref 4–5.2)
SODIUM SERPL-SCNC: 138 MMOL/L (ref 135–145)
T3FREE SERPL-MCNC: 3.3 PG/ML (ref 2.2–4)
T4 FREE SERPL-MCNC: 1.2 NG/DL (ref 0.8–1.5)
TIBC SERPL-MCNC: 406 MCG/DL (ref 250–450)
TRIGL SERPL-MCNC: 68 MG/DL
TSH SERPL-ACNC: 2.67 MCUNITS/ML (ref 0.35–5)
WBC # BLD: 6.6 K/MCL (ref 4.2–11)

## 2025-04-21 LAB
DHEA-S SERPL-MCNC: 221.1 MCG/DL (ref 8–391)
ZINC SERPL-MCNC: 74 MCG/DL (ref 70–120)

## 2025-04-23 LAB
SHBG SERPL-SCNC: 30 NMOL/L (ref 25–122)
TESTOST FREE SERPL DL<=1.0 NG/DL-MCNC: 4.7 PG/ML (ref 1.3–9.2)
TESTOST SERPL-MCNC: 27 NG/DL (ref 9–55)
TESTOSTERONE.FREE+WB SERPL-MCNC: 13.1 NG/DL (ref 4.1–25.5)